# Patient Record
Sex: MALE | Race: WHITE | Employment: UNEMPLOYED | URBAN - METROPOLITAN AREA
[De-identification: names, ages, dates, MRNs, and addresses within clinical notes are randomized per-mention and may not be internally consistent; named-entity substitution may affect disease eponyms.]

---

## 2018-12-24 ENCOUNTER — HOSPITAL ENCOUNTER (EMERGENCY)
Facility: CLINIC | Age: 53
Discharge: HOME | End: 2018-12-24
Admitting: PHYSICIAN ASSISTANT
Payer: COMMERCIAL

## 2018-12-24 VITALS
RESPIRATION RATE: 22 BRPM | HEART RATE: 65 BPM | DIASTOLIC BLOOD PRESSURE: 135 MMHG | OXYGEN SATURATION: 100 % | WEIGHT: 160 LBS | BODY MASS INDEX: 24.25 KG/M2 | HEIGHT: 68 IN | TEMPERATURE: 98.2 F | SYSTOLIC BLOOD PRESSURE: 183 MMHG

## 2018-12-24 DIAGNOSIS — S61.412A LACERATION OF LEFT HAND WITHOUT FOREIGN BODY, INITIAL ENCOUNTER: ICD-10-CM

## 2018-12-24 DIAGNOSIS — S69.92XA INJURY OF LEFT HAND, INITIAL ENCOUNTER: ICD-10-CM

## 2018-12-24 PROCEDURE — 99283 EMERGENCY DEPT VISIT LOW MDM: CPT

## 2018-12-24 PROCEDURE — 12001 RPR S/N/AX/GEN/TRNK 2.5CM/<: CPT

## 2018-12-24 ASSESSMENT — ENCOUNTER SYMPTOMS
WOUND: 1
NUMBNESS: 0

## 2018-12-24 ASSESSMENT — MIFFLIN-ST. JEOR: SCORE: 1545.26

## 2018-12-24 NOTE — ED AVS SNAPSHOT
[facility] Emergency Department  [address]  [facility] [address]  Phone:  [phone]  Fax:  [phone]                                    Pranav Loo   MRN: [id number]    Department:  [facility] Emergency Department   Date of Visit:  12/24/2018           After Visit Summary Signature Page    I have received my discharge instructions, and my questions have been answered. I have discussed any challenges I see with this plan with the nurse or doctor.    ..........................................................................................................................................  Patient/Patient Representative Signature      ..........................................................................................................................................  Patient Representative Print Name and Relationship to Patient    ..................................................               ................................................  Date                                   Time    ..........................................................................................................................................  Reviewed by Signature/Title    ...................................................              ..............................................  Date                                               Time          22EPIC Rev 08/18

## 2018-12-25 NOTE — DISCHARGE INSTRUCTIONS

## 2018-12-25 NOTE — ED PROVIDER NOTES
"  History     Chief Complaint:  Laceration    HPI   Pranav Loo is a 53 year old male who presents to the emergency department today for evaluation of laceration to the left hand. The patient reports he was opening gifts and cut his left hand with a . The patient denies numbness or tingling. The patient's last tetanus status was last updated on 03/25/2011.  Denies any other injury.  He is on any blood thinners.  The patient is right-handed.    Allergies:  Penicillin G     Medications:    Apresoline  Hydrodiuril     Past Medical History:    DJD  Hypertension  Aspirin     Past Surgical History:    Arthroplasty   Pelvis/hip surgery   Cardiac stent    Family History:    Brother: CAD    Social History:  The patient was accompanied to the ED by his daughter.  Smoking Status: Never Smoker  Smokeless Tobacco: Never Used  Alcohol Use: Negative   Marital Status:        Review of Systems   Skin: Positive for wound.   Neurological: Negative for numbness.   All other systems reviewed and are negative.    Physical Exam     Patient Vitals for the past 24 hrs:   BP Temp Temp src Pulse Resp SpO2 Height Weight   12/24/18 1959 (!) 183/135 98.2  F (36.8  C) Temporal 65 22 100 % 1.727 m (5' 8\") 72.6 kg (160 lb)     Physical Exam  General: Well appearing, well nourished. Normal mood and affect.  Skin: Simple, linear laceration dorsal webspace of the left thumb and index finger.  Proximally 1.5 cm in length.  Superficial, no obvious tendon involvement.  Able to visualize extensor tendons of left thumb.  No apparent laceration.  Range of motion intact. Bleeding controlled.  HEENT: Head: Normocephalic, atraumatic, no visible masses.   Eyes: Conjunctiva clear.  Cardiac: Normal rate and regular rhythm, no murmur or gallop.   Lungs: Clear to auscultation.  Musculoskeletal: Left Hand: The finger flexors (FDS/FDP) and extensors are intact.The thumb exam is normal, including: Adduction, abduction, flexion, extension, " opposition. There are no sensory deficits, Median, Ulnar, and Radial nerve function is normal. Radial artery pulsations are normal. Capillary refill is normal. Normal gait and station. No calf tenderness or swelling.   Neurologic: Oriented x 3. GCS: 15.  Psychiatric: Intact recent and remote memory, judgment and insight, normal mood and affect.       Emergency Department Course     Procedures:    Narrative: Procedure: Laceration Repair        LACERATION:  A 1.5 cm laceration.      LOCATION: Webspace between the left thumb and index finger, dorsal aspect.      FUNCTION:  Distally sensation, circulation, motor and tendon function are intact.      ANESTHESIA:  Digital block using 1% Lidocaine with epinephrine  total of 3 mLs      PREPARATION:  Irrigation and Scrubbing with Normal Saline      DEBRIDEMENT:  wound explored, no foreign body found      CLOSURE:  Wound was closed with One Layer.  Skin closed with 4 x 5.0 Ethylon using sutures.     Interventions:  None     Emergency Department Course:    2002 Nursing notes and vitals reviewed.    2005 I performed an exam of the patient as documented above.     2040 I personally answered all related questions prior to discharge.    Impression & Plan      Medical Decision Making:  Pranav Loo is a 53 year old male who presented to the ED today for evaluation of a laceration.  Details of the patient's history can be noted in the HPI.  The wound was carefully explored and evaluated.  The laceration was closed as noted in the procedure note above.  There was no evidence of muscular, tendon, bone, or nerve damage with this laceration.  There is no evidence of foreign body.  Possible complications such as infection and scarring were reviewed with the patient.  They will return to the ED for any signs of increased redness, streaking, drainage, fevers, new concerns.  They will apply bacitracin daily.  Additional suture care was discussed they will follow-up with her primary care  provider or another medical facility and provided in the discharge paperwork.  For suture removal as noted in their discharge instructions.  All questions were answered prior to the patient's discharge.  They were in agreement with the treatment plan as stated above.     Diagnosis:    ICD-10-CM    1. Laceration of left hand without foreign body, initial encounter S61.412A      Disposition:   The patient is discharged to home.     Discharge Medications:  No discharge medications    Scribe Disclosure:  I, Marina Landa, am serving as a scribe at 8:02 PM on 12/24/2018 to document services personally performed by Deb Mooney PA based on my observations and the provider's statements to me.    This was created at least in part with a voice recognition software. Mistakes/typos may be present.       [facility] EMERGENCY DEPARTMENT       Deb Mooney PA  12/24/18 [id number]

## 2018-12-25 NOTE — ED TRIAGE NOTES
Laceration to left hand.  Bleeding controlled PTA.      ABCs intact.  Alert and oriented x 3.    Tetanus status unknown.

## 2019-02-19 ENCOUNTER — HOSPITAL ENCOUNTER (OUTPATIENT)
Facility: CLINIC | Age: 54
Setting detail: OBSERVATION
Discharge: HOME | End: 2019-02-22
Attending: EMERGENCY MEDICINE | Admitting: INTERNAL MEDICINE
Payer: COMMERCIAL

## 2019-02-19 ENCOUNTER — APPOINTMENT (OUTPATIENT)
Dept: GENERAL RADIOLOGY | Facility: CLINIC | Age: 54
End: 2019-02-19
Attending: EMERGENCY MEDICINE
Payer: COMMERCIAL

## 2019-02-19 DIAGNOSIS — T68.XXXA HYPOTHERMIA, INITIAL ENCOUNTER: ICD-10-CM

## 2019-02-19 DIAGNOSIS — I10 BENIGN ESSENTIAL HYPERTENSION: Primary | ICD-10-CM

## 2019-02-19 DIAGNOSIS — R07.9 CHEST PAIN: ICD-10-CM

## 2019-02-19 LAB
ALBUMIN SERPL-MCNC: 4.5 G/DL (ref 3.4–5)
ALP SERPL-CCNC: 100 U/L (ref 40–150)
ALT SERPL W P-5'-P-CCNC: 39 U/L (ref 0–70)
ANION GAP SERPL CALCULATED.3IONS-SCNC: 17 MMOL/L (ref 3–14)
AST SERPL W P-5'-P-CCNC: 21 U/L (ref 0–45)
BASOPHILS # BLD AUTO: 0 10E9/L (ref 0–0.2)
BASOPHILS NFR BLD AUTO: 0.1 %
BILIRUB SERPL-MCNC: 0.6 MG/DL (ref 0.2–1.3)
BUN SERPL-MCNC: 28 MG/DL (ref 7–30)
CALCIUM SERPL-MCNC: 10 MG/DL (ref 8.5–10.1)
CHLORIDE SERPL-SCNC: 104 MMOL/L (ref 94–109)
CO2 SERPL-SCNC: 17 MMOL/L (ref 20–32)
CREAT SERPL-MCNC: 1.47 MG/DL (ref 0.66–1.25)
DIFFERENTIAL METHOD BLD: ABNORMAL
EOSINOPHIL # BLD AUTO: 0 10E9/L (ref 0–0.7)
EOSINOPHIL NFR BLD AUTO: 0 %
ERYTHROCYTE [DISTWIDTH] IN BLOOD BY AUTOMATED COUNT: 12.9 % (ref 10–15)
ETHANOL SERPL-MCNC: 0.04 G/DL
GFR SERPL CREATININE-BSD FRML MDRD: 54 ML/MIN/{1.73_M2}
GLUCOSE BLDC GLUCOMTR-MCNC: 161 MG/DL (ref 70–99)
GLUCOSE SERPL-MCNC: 111 MG/DL (ref 70–99)
HCT VFR BLD AUTO: 46.3 % (ref 40–53)
HGB BLD-MCNC: 16.5 G/DL (ref 13.3–17.7)
IMM GRANULOCYTES # BLD: 0.1 10E9/L (ref 0–0.4)
IMM GRANULOCYTES NFR BLD: 0.4 %
LYMPHOCYTES # BLD AUTO: 3.4 10E9/L (ref 0.8–5.3)
LYMPHOCYTES NFR BLD AUTO: 16 %
MAGNESIUM SERPL-MCNC: 2.4 MG/DL (ref 1.6–2.3)
MCH RBC QN AUTO: 32 PG (ref 26.5–33)
MCHC RBC AUTO-ENTMCNC: 35.6 G/DL (ref 31.5–36.5)
MCV RBC AUTO: 90 FL (ref 78–100)
MONOCYTES # BLD AUTO: 1.1 10E9/L (ref 0–1.3)
MONOCYTES NFR BLD AUTO: 5 %
NEUTROPHILS # BLD AUTO: 16.5 10E9/L (ref 1.6–8.3)
NEUTROPHILS NFR BLD AUTO: 78.5 %
NRBC # BLD AUTO: 0 10*3/UL
NRBC BLD AUTO-RTO: 0 /100
PLATELET # BLD AUTO: 297 10E9/L (ref 150–450)
POTASSIUM SERPL-SCNC: 2.9 MMOL/L (ref 3.4–5.3)
POTASSIUM SERPL-SCNC: 4.2 MMOL/L (ref 3.4–5.3)
PROT SERPL-MCNC: 8.3 G/DL (ref 6.8–8.8)
RBC # BLD AUTO: 5.16 10E12/L (ref 4.4–5.9)
SODIUM SERPL-SCNC: 138 MMOL/L (ref 133–144)
TROPONIN I SERPL-MCNC: <0.015 UG/L (ref 0–0.04)
WBC # BLD AUTO: 21 10E9/L (ref 4–11)

## 2019-02-19 PROCEDURE — 96374 THER/PROPH/DIAG INJ IV PUSH: CPT

## 2019-02-19 PROCEDURE — 99292 CRITICAL CARE ADDL 30 MIN: CPT

## 2019-02-19 PROCEDURE — 25800025 ZZH RX 258

## 2019-02-19 PROCEDURE — 84484 ASSAY OF TROPONIN QUANT: CPT | Performed by: EMERGENCY MEDICINE

## 2019-02-19 PROCEDURE — 85025 COMPLETE CBC W/AUTO DIFF WBC: CPT | Performed by: EMERGENCY MEDICINE

## 2019-02-19 PROCEDURE — 36415 COLL VENOUS BLD VENIPUNCTURE: CPT | Performed by: INTERNAL MEDICINE

## 2019-02-19 PROCEDURE — 25000132 ZZH RX MED GY IP 250 OP 250 PS 637: Performed by: EMERGENCY MEDICINE

## 2019-02-19 PROCEDURE — G0378 HOSPITAL OBSERVATION PER HR: HCPCS

## 2019-02-19 PROCEDURE — 00000146 ZZHCL STATISTIC GLUCOSE BY METER IP

## 2019-02-19 PROCEDURE — 80053 COMPREHEN METABOLIC PANEL: CPT | Performed by: EMERGENCY MEDICINE

## 2019-02-19 PROCEDURE — 99220 ZZC INITIAL OBSERVATION CARE,LEVL III: CPT | Performed by: INTERNAL MEDICINE

## 2019-02-19 PROCEDURE — 93005 ELECTROCARDIOGRAM TRACING: CPT

## 2019-02-19 PROCEDURE — 83036 HEMOGLOBIN GLYCOSYLATED A1C: CPT | Performed by: INTERNAL MEDICINE

## 2019-02-19 PROCEDURE — 25800025 ZZH RX 258: Performed by: EMERGENCY MEDICINE

## 2019-02-19 PROCEDURE — 25800030 ZZH RX IP 258 OP 636: Performed by: INTERNAL MEDICINE

## 2019-02-19 PROCEDURE — 99291 CRITICAL CARE FIRST HOUR: CPT

## 2019-02-19 PROCEDURE — 83735 ASSAY OF MAGNESIUM: CPT | Performed by: EMERGENCY MEDICINE

## 2019-02-19 PROCEDURE — 84132 ASSAY OF SERUM POTASSIUM: CPT | Performed by: INTERNAL MEDICINE

## 2019-02-19 PROCEDURE — 25000132 ZZH RX MED GY IP 250 OP 250 PS 637: Performed by: INTERNAL MEDICINE

## 2019-02-19 PROCEDURE — 25000128 H RX IP 250 OP 636: Performed by: EMERGENCY MEDICINE

## 2019-02-19 PROCEDURE — 25000128 H RX IP 250 OP 636

## 2019-02-19 PROCEDURE — 71045 X-RAY EXAM CHEST 1 VIEW: CPT

## 2019-02-19 PROCEDURE — 80320 DRUG SCREEN QUANTALCOHOLS: CPT | Performed by: EMERGENCY MEDICINE

## 2019-02-19 PROCEDURE — 84484 ASSAY OF TROPONIN QUANT: CPT | Mod: 91 | Performed by: INTERNAL MEDICINE

## 2019-02-19 RX ORDER — TRIAMTERENE AND HYDROCHLOROTHIAZIDE 37.5; 25 MG/1; MG/1
1 CAPSULE ORAL EVERY MORNING
Status: ON HOLD | COMMUNITY
End: 2019-02-22

## 2019-02-19 RX ORDER — LOSARTAN POTASSIUM 100 MG/1
100 TABLET ORAL DAILY
COMMUNITY

## 2019-02-19 RX ORDER — CLINDAMYCIN HCL 300 MG
600 CAPSULE ORAL DAILY PRN
COMMUNITY

## 2019-02-19 RX ORDER — CLOPIDOGREL BISULFATE 75 MG/1
75 TABLET ORAL DAILY
Status: DISCONTINUED | OUTPATIENT
Start: 2019-02-20 | End: 2019-02-22 | Stop reason: HOSPADM

## 2019-02-19 RX ORDER — ATORVASTATIN CALCIUM 40 MG/1
40 TABLET, FILM COATED ORAL AT BEDTIME
Status: DISCONTINUED | OUTPATIENT
Start: 2019-02-19 | End: 2019-02-22 | Stop reason: HOSPADM

## 2019-02-19 RX ORDER — ASPIRIN 81 MG/1
81 TABLET, CHEWABLE ORAL DAILY
COMMUNITY

## 2019-02-19 RX ORDER — POTASSIUM CHLORIDE 1.5 G/1.58G
40 POWDER, FOR SOLUTION ORAL ONCE
Status: COMPLETED | OUTPATIENT
Start: 2019-02-19 | End: 2019-02-19

## 2019-02-19 RX ORDER — NITROGLYCERIN 0.4 MG/1
0.4 TABLET SUBLINGUAL EVERY 5 MIN PRN
Status: DISCONTINUED | OUTPATIENT
Start: 2019-02-19 | End: 2019-02-19

## 2019-02-19 RX ORDER — POTASSIUM CHLORIDE 7.45 MG/ML
10 INJECTION INTRAVENOUS
Status: DISCONTINUED | OUTPATIENT
Start: 2019-02-19 | End: 2019-02-22 | Stop reason: HOSPADM

## 2019-02-19 RX ORDER — LIDOCAINE 40 MG/G
CREAM TOPICAL
Status: DISCONTINUED | OUTPATIENT
Start: 2019-02-19 | End: 2019-02-21

## 2019-02-19 RX ORDER — POTASSIUM CL/LIDO/0.9 % NACL 10MEQ/0.1L
10 INTRAVENOUS SOLUTION, PIGGYBACK (ML) INTRAVENOUS
Status: DISCONTINUED | OUTPATIENT
Start: 2019-02-19 | End: 2019-02-22 | Stop reason: HOSPADM

## 2019-02-19 RX ORDER — ASPIRIN 81 MG/1
324 TABLET, CHEWABLE ORAL ONCE
Status: COMPLETED | OUTPATIENT
Start: 2019-02-19 | End: 2019-02-19

## 2019-02-19 RX ORDER — LABETALOL HYDROCHLORIDE 5 MG/ML
10 INJECTION, SOLUTION INTRAVENOUS
Status: DISCONTINUED | OUTPATIENT
Start: 2019-02-19 | End: 2019-02-22 | Stop reason: HOSPADM

## 2019-02-19 RX ORDER — NITROGLYCERIN 0.4 MG/1
0.4 TABLET SUBLINGUAL EVERY 5 MIN PRN
Status: DISCONTINUED | OUTPATIENT
Start: 2019-02-19 | End: 2019-02-22 | Stop reason: HOSPADM

## 2019-02-19 RX ORDER — ONDANSETRON 2 MG/ML
4 INJECTION INTRAMUSCULAR; INTRAVENOUS EVERY 6 HOURS PRN
Status: DISCONTINUED | OUTPATIENT
Start: 2019-02-19 | End: 2019-02-22 | Stop reason: HOSPADM

## 2019-02-19 RX ORDER — CLOPIDOGREL BISULFATE 75 MG/1
75 TABLET ORAL DAILY
COMMUNITY

## 2019-02-19 RX ORDER — POTASSIUM CHLORIDE 1500 MG/1
20-40 TABLET, EXTENDED RELEASE ORAL
Status: DISCONTINUED | OUTPATIENT
Start: 2019-02-19 | End: 2019-02-22 | Stop reason: HOSPADM

## 2019-02-19 RX ORDER — SODIUM CHLORIDE 9 MG/ML
INJECTION, SOLUTION INTRAVENOUS CONTINUOUS
Status: DISCONTINUED | OUTPATIENT
Start: 2019-02-19 | End: 2019-02-20

## 2019-02-19 RX ORDER — ALUMINA, MAGNESIA, AND SIMETHICONE 2400; 2400; 240 MG/30ML; MG/30ML; MG/30ML
30 SUSPENSION ORAL EVERY 4 HOURS PRN
Status: DISCONTINUED | OUTPATIENT
Start: 2019-02-19 | End: 2019-02-22 | Stop reason: HOSPADM

## 2019-02-19 RX ORDER — FAMOTIDINE 20 MG
1000 TABLET ORAL DAILY
COMMUNITY

## 2019-02-19 RX ORDER — POTASSIUM CHLORIDE 1.5 G/1.58G
20-40 POWDER, FOR SOLUTION ORAL
Status: DISCONTINUED | OUTPATIENT
Start: 2019-02-19 | End: 2019-02-22 | Stop reason: HOSPADM

## 2019-02-19 RX ORDER — ONDANSETRON 4 MG/1
4 TABLET, ORALLY DISINTEGRATING ORAL EVERY 6 HOURS PRN
Status: DISCONTINUED | OUTPATIENT
Start: 2019-02-19 | End: 2019-02-22 | Stop reason: HOSPADM

## 2019-02-19 RX ORDER — ACETAMINOPHEN 650 MG/1
650 SUPPOSITORY RECTAL EVERY 4 HOURS PRN
Status: DISCONTINUED | OUTPATIENT
Start: 2019-02-19 | End: 2019-02-22 | Stop reason: HOSPADM

## 2019-02-19 RX ORDER — NITROGLYCERIN 0.4 MG/1
0.4 TABLET SUBLINGUAL EVERY 5 MIN PRN
COMMUNITY

## 2019-02-19 RX ORDER — ATORVASTATIN CALCIUM 40 MG/1
40 TABLET, FILM COATED ORAL AT BEDTIME
COMMUNITY

## 2019-02-19 RX ORDER — LOSARTAN POTASSIUM 100 MG/1
100 TABLET ORAL DAILY
Status: DISCONTINUED | OUTPATIENT
Start: 2019-02-20 | End: 2019-02-22 | Stop reason: HOSPADM

## 2019-02-19 RX ORDER — ASPIRIN 81 MG/1
81 TABLET, CHEWABLE ORAL DAILY
Status: DISCONTINUED | OUTPATIENT
Start: 2019-02-20 | End: 2019-02-21

## 2019-02-19 RX ORDER — NALOXONE HYDROCHLORIDE 0.4 MG/ML
.1-.4 INJECTION, SOLUTION INTRAMUSCULAR; INTRAVENOUS; SUBCUTANEOUS
Status: DISCONTINUED | OUTPATIENT
Start: 2019-02-19 | End: 2019-02-22 | Stop reason: HOSPADM

## 2019-02-19 RX ORDER — POTASSIUM CHLORIDE 29.8 MG/ML
20 INJECTION INTRAVENOUS
Status: DISCONTINUED | OUTPATIENT
Start: 2019-02-19 | End: 2019-02-19

## 2019-02-19 RX ORDER — METOPROLOL TARTRATE 25 MG/1
25 TABLET, FILM COATED ORAL 2 TIMES DAILY
Status: DISCONTINUED | OUTPATIENT
Start: 2019-02-19 | End: 2019-02-20

## 2019-02-19 RX ORDER — ONDANSETRON 2 MG/ML
INJECTION INTRAMUSCULAR; INTRAVENOUS
Status: COMPLETED
Start: 2019-02-19 | End: 2019-02-19

## 2019-02-19 RX ORDER — LORAZEPAM 0.5 MG/1
0.5 TABLET ORAL 3 TIMES DAILY PRN
Status: DISCONTINUED | OUTPATIENT
Start: 2019-02-19 | End: 2019-02-22 | Stop reason: HOSPADM

## 2019-02-19 RX ORDER — ACETAMINOPHEN 325 MG/1
650 TABLET ORAL EVERY 4 HOURS PRN
Status: DISCONTINUED | OUTPATIENT
Start: 2019-02-19 | End: 2019-02-22 | Stop reason: HOSPADM

## 2019-02-19 RX ORDER — MORPHINE SULFATE 2 MG/ML
1 INJECTION, SOLUTION INTRAMUSCULAR; INTRAVENOUS
Status: DISCONTINUED | OUTPATIENT
Start: 2019-02-19 | End: 2019-02-22 | Stop reason: HOSPADM

## 2019-02-19 RX ORDER — DEXTROSE MONOHYDRATE 25 G/50ML
50 INJECTION, SOLUTION INTRAVENOUS ONCE
Status: COMPLETED | OUTPATIENT
Start: 2019-02-19 | End: 2019-02-19

## 2019-02-19 RX ORDER — POTASSIUM CHLORIDE 1.5 G/1.58G
20 POWDER, FOR SOLUTION ORAL DAILY
COMMUNITY

## 2019-02-19 RX ORDER — METOPROLOL TARTRATE 25 MG/1
25 TABLET, FILM COATED ORAL 2 TIMES DAILY
Status: ON HOLD | COMMUNITY
End: 2019-02-22

## 2019-02-19 RX ADMIN — DEXTROSE MONOHYDRATE 50 ML: 25 INJECTION, SOLUTION INTRAVENOUS at 19:11

## 2019-02-19 RX ADMIN — ATORVASTATIN CALCIUM 40 MG: 40 TABLET, FILM COATED ORAL at 21:59

## 2019-02-19 RX ADMIN — ONDANSETRON 4 MG: 2 INJECTION INTRAMUSCULAR; INTRAVENOUS at 19:10

## 2019-02-19 RX ADMIN — METOPROLOL TARTRATE 25 MG: 25 TABLET ORAL at 21:59

## 2019-02-19 RX ADMIN — POTASSIUM CHLORIDE 40 MEQ: 1.5 POWDER, FOR SOLUTION ORAL at 20:21

## 2019-02-19 RX ADMIN — ASPIRIN 81 MG 324 MG: 81 TABLET ORAL at 19:17

## 2019-02-19 RX ADMIN — SODIUM CHLORIDE: 9 INJECTION, SOLUTION INTRAVENOUS at 21:59

## 2019-02-19 RX ADMIN — SODIUM CHLORIDE 2000 ML: 9 INJECTION, SOLUTION INTRAVENOUS at 19:16

## 2019-02-19 ASSESSMENT — ENCOUNTER SYMPTOMS: SHORTNESS OF BREATH: 1

## 2019-02-19 ASSESSMENT — MIFFLIN-ST. JEOR: SCORE: 1552.5

## 2019-02-20 ENCOUNTER — APPOINTMENT (OUTPATIENT)
Dept: NUCLEAR MEDICINE | Facility: CLINIC | Age: 54
End: 2019-02-20
Attending: INTERNAL MEDICINE
Payer: COMMERCIAL

## 2019-02-20 LAB
ANION GAP SERPL CALCULATED.3IONS-SCNC: 9 MMOL/L (ref 3–14)
BUN SERPL-MCNC: 22 MG/DL (ref 7–30)
CALCIUM SERPL-MCNC: 8.3 MG/DL (ref 8.5–10.1)
CHLORIDE SERPL-SCNC: 113 MMOL/L (ref 94–109)
CHOLEST SERPL-MCNC: 129 MG/DL
CO2 SERPL-SCNC: 19 MMOL/L (ref 20–32)
CREAT SERPL-MCNC: 1.18 MG/DL (ref 0.66–1.25)
ERYTHROCYTE [DISTWIDTH] IN BLOOD BY AUTOMATED COUNT: 13.2 % (ref 10–15)
GFR SERPL CREATININE-BSD FRML MDRD: 70 ML/MIN/{1.73_M2}
GLUCOSE BLDC GLUCOMTR-MCNC: 101 MG/DL (ref 70–99)
GLUCOSE BLDC GLUCOMTR-MCNC: 86 MG/DL (ref 70–99)
GLUCOSE SERPL-MCNC: 68 MG/DL (ref 70–99)
HBA1C MFR BLD: 6.1 % (ref 0–5.6)
HCT VFR BLD AUTO: 42.2 % (ref 40–53)
HDLC SERPL-MCNC: 46 MG/DL
HGB BLD-MCNC: 15 G/DL (ref 13.3–17.7)
LDLC SERPL CALC-MCNC: 62 MG/DL
MCH RBC QN AUTO: 31.9 PG (ref 26.5–33)
MCHC RBC AUTO-ENTMCNC: 35.5 G/DL (ref 31.5–36.5)
MCV RBC AUTO: 90 FL (ref 78–100)
NONHDLC SERPL-MCNC: 83 MG/DL
PLATELET # BLD AUTO: 218 10E9/L (ref 150–450)
POTASSIUM SERPL-SCNC: 3.8 MMOL/L (ref 3.4–5.3)
RBC # BLD AUTO: 4.7 10E12/L (ref 4.4–5.9)
SODIUM SERPL-SCNC: 141 MMOL/L (ref 133–144)
TRIGL SERPL-MCNC: 103 MG/DL
TROPONIN I SERPL-MCNC: 0.02 UG/L (ref 0–0.04)
TROPONIN I SERPL-MCNC: 0.03 UG/L (ref 0–0.04)
TROPONIN I SERPL-MCNC: 0.04 UG/L (ref 0–0.04)
WBC # BLD AUTO: 10.3 10E9/L (ref 4–11)

## 2019-02-20 PROCEDURE — 99214 OFFICE O/P EST MOD 30 MIN: CPT | Mod: 25 | Performed by: INTERNAL MEDICINE

## 2019-02-20 PROCEDURE — 00000146 ZZHCL STATISTIC GLUCOSE BY METER IP

## 2019-02-20 PROCEDURE — 93016 CV STRESS TEST SUPVJ ONLY: CPT | Performed by: INTERNAL MEDICINE

## 2019-02-20 PROCEDURE — 84484 ASSAY OF TROPONIN QUANT: CPT | Performed by: INTERNAL MEDICINE

## 2019-02-20 PROCEDURE — 25800030 ZZH RX IP 258 OP 636: Performed by: INTERNAL MEDICINE

## 2019-02-20 PROCEDURE — 25000132 ZZH RX MED GY IP 250 OP 250 PS 637: Performed by: INTERNAL MEDICINE

## 2019-02-20 PROCEDURE — 93017 CV STRESS TEST TRACING ONLY: CPT

## 2019-02-20 PROCEDURE — 78452 HT MUSCLE IMAGE SPECT MULT: CPT

## 2019-02-20 PROCEDURE — 78452 HT MUSCLE IMAGE SPECT MULT: CPT | Mod: 26 | Performed by: INTERNAL MEDICINE

## 2019-02-20 PROCEDURE — 40000863 ZZH STATISTIC RADIOLOGY XRAY, US, CT, MAR, NM

## 2019-02-20 PROCEDURE — 34300033 ZZH RX 343: Performed by: INTERNAL MEDICINE

## 2019-02-20 PROCEDURE — 99207 ZZC CDG-CODE CATEGORY CHANGED: CPT | Performed by: INTERNAL MEDICINE

## 2019-02-20 PROCEDURE — 80061 LIPID PANEL: CPT | Performed by: INTERNAL MEDICINE

## 2019-02-20 PROCEDURE — 36415 COLL VENOUS BLD VENIPUNCTURE: CPT | Performed by: INTERNAL MEDICINE

## 2019-02-20 PROCEDURE — G0378 HOSPITAL OBSERVATION PER HR: HCPCS

## 2019-02-20 PROCEDURE — 80048 BASIC METABOLIC PNL TOTAL CA: CPT | Performed by: INTERNAL MEDICINE

## 2019-02-20 PROCEDURE — 85027 COMPLETE CBC AUTOMATED: CPT | Performed by: INTERNAL MEDICINE

## 2019-02-20 PROCEDURE — A9502 TC99M TETROFOSMIN: HCPCS | Performed by: INTERNAL MEDICINE

## 2019-02-20 PROCEDURE — 25000128 H RX IP 250 OP 636: Performed by: INTERNAL MEDICINE

## 2019-02-20 PROCEDURE — 93018 CV STRESS TEST I&R ONLY: CPT | Performed by: INTERNAL MEDICINE

## 2019-02-20 PROCEDURE — 99226 ZZC SUBSEQUENT OBSERVATION CARE,LEVEL III: CPT | Performed by: INTERNAL MEDICINE

## 2019-02-20 RX ORDER — ALBUTEROL SULFATE 90 UG/1
2 AEROSOL, METERED RESPIRATORY (INHALATION) EVERY 5 MIN PRN
Status: DISCONTINUED | OUTPATIENT
Start: 2019-02-20 | End: 2019-02-20

## 2019-02-20 RX ORDER — REGADENOSON 0.08 MG/ML
0.4 INJECTION, SOLUTION INTRAVENOUS ONCE
Status: COMPLETED | OUTPATIENT
Start: 2019-02-20 | End: 2019-02-20

## 2019-02-20 RX ORDER — POTASSIUM CHLORIDE 1.5 G/1.58G
20 POWDER, FOR SOLUTION ORAL DAILY
Status: DISCONTINUED | OUTPATIENT
Start: 2019-02-20 | End: 2019-02-20

## 2019-02-20 RX ORDER — TRIAMTERENE/HYDROCHLOROTHIAZID 37.5-25 MG
1 TABLET ORAL EVERY MORNING
Status: DISCONTINUED | OUTPATIENT
Start: 2019-02-20 | End: 2019-02-20

## 2019-02-20 RX ORDER — ACYCLOVIR 200 MG/1
0-1 CAPSULE ORAL
Status: DISCONTINUED | OUTPATIENT
Start: 2019-02-20 | End: 2019-02-20

## 2019-02-20 RX ORDER — HYDRALAZINE HYDROCHLORIDE 25 MG/1
25 TABLET, FILM COATED ORAL EVERY 8 HOURS SCHEDULED
Status: DISCONTINUED | OUTPATIENT
Start: 2019-02-20 | End: 2019-02-22

## 2019-02-20 RX ORDER — CAFFEINE 200 MG
200 TABLET ORAL
Status: DISCONTINUED | OUTPATIENT
Start: 2019-02-20 | End: 2019-02-20

## 2019-02-20 RX ORDER — CAFFEINE CITRATE 20 MG/ML
60 SOLUTION INTRAVENOUS
Status: DISCONTINUED | OUTPATIENT
Start: 2019-02-20 | End: 2019-02-20

## 2019-02-20 RX ORDER — AMINOPHYLLINE 25 MG/ML
50-100 INJECTION, SOLUTION INTRAVENOUS
Status: DISCONTINUED | OUTPATIENT
Start: 2019-02-20 | End: 2019-02-20

## 2019-02-20 RX ORDER — CARVEDILOL 12.5 MG/1
12.5 TABLET ORAL 2 TIMES DAILY WITH MEALS
Status: DISCONTINUED | OUTPATIENT
Start: 2019-02-20 | End: 2019-02-22 | Stop reason: HOSPADM

## 2019-02-20 RX ADMIN — HYDRALAZINE HYDROCHLORIDE 25 MG: 25 TABLET ORAL at 21:54

## 2019-02-20 RX ADMIN — ASPIRIN 81 MG 81 MG: 81 TABLET ORAL at 07:59

## 2019-02-20 RX ADMIN — CARVEDILOL 12.5 MG: 12.5 TABLET, FILM COATED ORAL at 09:33

## 2019-02-20 RX ADMIN — TETROFOSMIN 10 MCI.: 1.38 INJECTION, POWDER, LYOPHILIZED, FOR SOLUTION INTRAVENOUS at 07:20

## 2019-02-20 RX ADMIN — CLOPIDOGREL BISULFATE 75 MG: 75 TABLET ORAL at 08:00

## 2019-02-20 RX ADMIN — CARVEDILOL 12.5 MG: 12.5 TABLET, FILM COATED ORAL at 18:15

## 2019-02-20 RX ADMIN — HYDRALAZINE HYDROCHLORIDE 25 MG: 25 TABLET ORAL at 16:23

## 2019-02-20 RX ADMIN — REGADENOSON 0.4 MG: 0.08 INJECTION, SOLUTION INTRAVENOUS at 09:08

## 2019-02-20 RX ADMIN — TETROFOSMIN 32.5 MCI.: 1.38 INJECTION, POWDER, LYOPHILIZED, FOR SOLUTION INTRAVENOUS at 09:12

## 2019-02-20 RX ADMIN — LOSARTAN POTASSIUM 100 MG: 100 TABLET, FILM COATED ORAL at 08:00

## 2019-02-20 RX ADMIN — ATORVASTATIN CALCIUM 40 MG: 40 TABLET, FILM COATED ORAL at 21:54

## 2019-02-20 RX ADMIN — ACETAMINOPHEN 650 MG: 325 TABLET, FILM COATED ORAL at 19:36

## 2019-02-20 RX ADMIN — SODIUM CHLORIDE: 9 INJECTION, SOLUTION INTRAVENOUS at 07:05

## 2019-02-20 NOTE — PROGRESS NOTES
[facility]    Medicine Progress Note - Hospitalist Service       Date of Admission:  2/19/2019  Assessment & Plan   Mr. Pranav Loo is a delightful 53-year-old  gentleman with a past medical history notable for coronary artery disease, hypertension, dyslipidemia, degenerative disk disease of the lumbar spine, osteoarthritis of the hip, and chronic kidney disease stage III, who has presented to the Emergency Department with the complaint of chest pain after shoveling snow.     Chest Pain - It is described as intermittent precordial chest pressure with exertion, 5-6 / 10 in severity, associated with diaphoresis and feeling cold, but no radiation to the back, jaw, or left arm.  HIs symptoms of excessive sweating and feeling cold are likely due to hypothermia, in the setting working outside shoveling snow. Known CAD, s/p YAMIL to the LAD in 01/2016. The electrocardiogram reveals a normal sinus rhythm, LVH, nonspecific ST abnormality, and RV conduction delay. Troponins negative, d-dimer negative. CXR clear.    - Mildly abnormal NM stress test: shows partially fixed basal to mid anteroseptal defect c/w nontransmural scar with mild chhaya-infarct ischemia.   - Appreciate cardiology consult. Discussed with Dr. Peterson. Plan for coronary angiogram tomorrow.     HTN   - BP uncontrolled in the 170's to 180s on 02/20/19 > stop IVF  - continues on PTA losartan 100 mg daily   - changed metoprolol 25 mg BID to coreg 12.5 mg BID on 02/20/19.   - holding triamterene/HCTZ in the context of mild SEBASTIAN, hold on resuming today due to plan for contrast tomorrow  - start hydralazine 25 mg TID prior to cath with plan to resume diuretic post cath.     Acute kidney injury:  The patient has a known history of chronic kidney disease stage III with a baseline creatinine in the range of 1.1-1.2. Cr at admission 1.47 / BUN of 28, secondary to prerenal azotemia due to volume depletion after excessive exertion.   - Cr back to  baseline with IVF.     Hypothermia:  Upon arrival, the patient was hypothermic with a temperature of 92.9 degrees Fahrenheit due to shoveling snow outside for several hours.  His temperature has improved to 97.4 after treatment with a Faby Hugger and warm normal saline.   - Temperature normal on 02/20/19.    Hypokalemia:  The potassium level is 2.9.  Of note, he is on Dyazide 37.5-25 mg p.o. daily, which will be held.  In the ER, the patient has received 40 mEq of potassium chloride.  I will place the patient on a potassium replacement protocol.  His potassium level will be monitored.     Pre-diabetes: A1c 6.1  , mildly low today, asymptomatic. Not on insulin or diabetic medication.   - Follow BS TID  Recent Labs   Lab 02/20/19  0606 02/19/19  1905 02/19/19  1900   GLC 68*  --  111*   BGM  --  161*  --        Dyslipidemia:    - continue PTA statin. LDL 62, HDL 46.     Leukocytosis, RESOLVED 21,000 at admission, secondary to stress response. Normal on 02/20/19          Diet: Regular Diet Adult    DVT Prophylaxis: Pneumatic Compression Devices  Lainez Catheter: not present  Code Status: Full Code      Disposition Plan   Expected discharge: 1-2 days, recommended to prior living arrangement once post angiogram and depending on results. .  Entered: Candelaria Bolivar MD 02/20/2019, 5:22 PM       The patient's care was discussed with the family and cardiologist.    Candelaria Bolivar MD  Hospitalist Service  [facility]    ______________________________________________________________________    Interval History   No further chest pain. No SOB, N/V. Wants to be up and walking a bit. Anxious about pain and glad we are going to proceed with angiogram.     Data reviewed today: I reviewed all medications, new labs and imaging results over the last 24 hours. I personally reviewed no images or EKG's today.    Physical Exam   Vital Signs: Temp: 97.5  F (36.4  C) Temp src: Oral BP: (!) 158/92 Pulse: 61 Heart  Rate: 60 Resp: 16 SpO2: 97 % O2 Device: None (Room air)    Weight: 161 lbs 9.55 oz  Constitutional:  NAD,   Neuropsyche:  alert and oriented, answers questions appropriately.   Respiratory:  Breathing comfortably, good air exchange, no wheezes, no crackles.   Cardiovascular:  Regular rate and rhythm, no edema.  GI:  soft, NT/ND, BS normal  Skin/Integumen:  No acute rash, bruising or sign of bleeding.       Data   Recent Labs   Lab 02/20/19  0930 02/20/19  0606 02/20/19  0305 02/19/19  2335 02/19/19  1900   WBC  --  10.3  --   --  21.0*   HGB  --  15.0  --   --  16.5   MCV  --  90  --   --  90   PLT  --  218  --   --  297   NA  --  141  --   --  138   POTASSIUM  --  3.8  --  4.2 2.9*   CHLORIDE  --  113*  --   --  104   CO2  --  19*  --   --  17*   BUN  --  22  --   --  28   CR  --  1.18  --   --  1.47*   ANIONGAP  --  9  --   --  17*   SHWETA  --  8.3*  --   --  10.0   GLC  --  68*  --   --  111*   ALBUMIN  --   --   --   --  4.5   PROTTOTAL  --   --   --   --  8.3   BILITOTAL  --   --   --   --  0.6   ALKPHOS  --   --   --   --  100   ALT  --   --   --   --  39   AST  --   --   --   --  21   TROPI 0.021  --  0.036 0.026 <0.015     Recent Results (from the past 24 hour(s))   Chest  XR, 1 view PORTABLE    Narrative    CHEST PORTABLE ONE VIEW 2/19/2019 7:45 PM     HISTORY: Chest pain and shortness of breath.    COMPARISON: 8/22/2015      Impression    IMPRESSION:  No acute consolidation.    VIJAY FLORES MD   NM Lexiscan stress test (nuc card)    Narrative    GATED MYOCARDIAL PERFUSION SCINTIGRAPHY WITH INTRAVENOUS PHARMACOLOGIC  VASODILATATION LEXISCAN -ONE DAY STUDY     2/20/2019 10:24 AM  PHOEBE GAMEZ  53 years  Male  1965.    Indication/Clinical History: History of CAD    Impression  1.  Myocardial perfusion imaging using single isotope technique  demonstrated partially fixed basal to mid anteroseptal defect  consistent with nontransmural scar and mild chhaya-infarct ischemia.  There is a second basal inferior  inferior septal and inferolateral  defect which appears mostly fixed.   2. Gated images demonstrated normal LV cavity size and systolic  function.  The left ventricular systolic function is 79%.  3. Compared to the prior study from no prior study .    Procedure  Pharmacologic stress testing was performed with Lexiscan at a rate of  0.08 mg/ml rapid bolus injection, for 15 seconds, 0.4 mg/5ml  intravenously. Low-level exercise was performed along with the  vasodilator infusion.  The heart rate was 64 at baseline and lavinia to  105 beats per minute during the Lexiscan infusion. The rest blood  pressure was 179/95 mmHg and was 158/116 mm Hg during Lexiscan  infusion. The patient experienced no symptoms  during the test.    Myocardial perfusion imaging was performed at rest, approximately 45  minutes after the injection intravenously of 10 mCi of Tc-99m Myoview.  At peak pharmacologic effect, 10-20 seconds after Lexiscan,  the  patient was injected intravenously with 32 mCi of  Tc-99m Myoview. The  post-stress tomographic imaging was performed approximately 60 minutes  after stress.    EKG Findings  The resting EKG demonstrated normal sinus rhythm. The stress EKG  demonstrated no ischemic changes.    Tomographic Findings  Overall, the study quality is adequate . On the stress images, mild to  moderate count reduction is seen in the basal to mid anterior  anteroseptal wall and mild to moderate count reduction is seen in the  basal inferior inferior septal and inferolateral walls. On the rest  images, there is reversibility seen in the anteroseptal wall with  similar fixed pattern of decreased counts in the basal inferior  inferior septal and inferolateral walls. Results suggest a  nontransmural scar of the basal to mid anteroseptal wall with mild  chhaya-infarct ischemia and mostly fixed basal inferior inferior septal  and inferolateral defect . Gated images demonstrated normal LV cavity  size with end-diastolic volumes  measuring 102 and 10 7 mL and rest and  stress respectively. LV systolic function appears normal. The left  ventricular ejection fraction was calculated to be 79%. TID was  absent.    DORY MOSS MD     Medications       aspirin  81 mg Oral Daily     atorvastatin  40 mg Oral At Bedtime     carvedilol  12.5 mg Oral BID w/meals     clopidogrel  75 mg Oral Daily     hydrALAZINE  25 mg Oral Q8H SAPPHIRE     losartan  100 mg Oral Daily     sodium chloride (PF)  10 mL Intravenous Once     sodium chloride (PF)  3 mL Intracatheter Q8H

## 2019-02-20 NOTE — CONSULTS
Care Transition Initial Assessment - SW     Met with: Patient and Family    Active Problems:    * No active hospital problems. *       DATA  Lives With: spouse      Quality of Family Relationships: supportive, involved  Description of Support System: Supportive, Involved  Who is your support system?: Wife  Support Assessment: Adequate family and caregiver support.   Identified issues/concerns regarding health management: discharge planning      Quality of Family Relationships: supportive, involved  Transportation Anticipated: family or friend will provide    ASSESSMENT  Cognitive Status:  awake, alert and oriented    Concerns to be addressed: Per SW consult for discharge planning. Pt admitted 2/19/19 with chest pain, pt holds disability status for advanced osteoarthritis. Discharge pending, pt has angio in the morning. SW met with pt and wife Clarisa at bedside. Pt anticipates going home with his wife at discharge. Pt wife asking SW about services in the home for the future, should pt need assistance. SW informing pt about private pay home care agencies in addition to [facility] programs / waivers. At this time, no recommendations for rehab or additional needs.     Please re-consult SW should further needs arise.      PLAN  Financial costs for the patient includes N/A  Patient given options and choices for discharge Home w/ spouse   Patient/family is agreeable to the plan?  Yes  Patient Goals and Preferences: Home w/ spouse   Patient anticipates discharging to:  Home w/ spouse     ABE Arceo

## 2019-02-20 NOTE — PLAN OF CARE
PRIMARY DIAGNOSIS: CHEST PAIN  OUTPATIENT/OBSERVATION GOALS TO BE MET BEFORE DISCHARGE:  1. Ruled out acute coronary syndrome (negative or stable Troponin):  Yes  2. Pain Status: Pain free.  3. Appropriate provocative testing performed: No, Angio tomorrow  - Stress Test Procedure: NM Lexiscan, will have Angio tomorrow  - Interpretation of cardiac rhythm per telemetry tech: Sinus Nas/SR    4. Cleared by Consultants (if applicable):No, Cards following  5. Return to near baseline physical activity: Yes  Discharge Planner Nurse   Safe discharge environment identified: Yes  Barriers to discharge: No       Entered by: Lilo Kuhn 02/20/2019 4:39 PM     Please review provider order for any additional goals.   Nurse to notify provider when observation goals have been met and patient is ready for discharge.

## 2019-02-20 NOTE — ED NOTES
Bed: ST01  Expected date:   Expected time:   Means of arrival:   Comments:  516  53 M cardiac changes/varying HR  1900

## 2019-02-20 NOTE — PLAN OF CARE
AVSS; tele SB/SR; pt denied pain or shortness of breath overnight; troponins < 0.015,  0.026, and 0.036; MD aware; 0900 troponin ordered; pt NPO after 0300 for NM Lexiscan; up with SBA; voiding; social work consulted.

## 2019-02-20 NOTE — PROGRESS NOTES
Serial troponins and stress test complete. - in progress  - Seen and cleared by consultant if applicable - in progress  - Adequate pain control on oral analgesia - met  - Vital signs normal or at patient baseline - in progress  - Safe disposition plan has been identified - met    Patient A&0x4. Up ind in room. Denies chest pain and discomfort. Skin clammy. Denies nausea. Tele NSR. Lungs clear. No edema noted.

## 2019-02-20 NOTE — PROGRESS NOTES
Hospitalist Cross Cover Note:    I was notified about a troponin elevation, the patients troponin is actually still within normal limits but is detectable.  Has gone up from 0.026 to 0.036 over the past 4 hours.  Will continue to follow although the patient has had 3 negative troponins, will get another level drawn at 0900 this AM.  No change in patients hemodynamic status.       Chet Jason MD  [facility] Hospitalist  Pager [phone]

## 2019-02-20 NOTE — PHARMACY-ADMISSION MEDICATION HISTORY
"Admission medication history interview status for the 2/19/2019  admission is complete. See EPIC admission navigator for prior to admission medications     Medication history source reliability:Good    Actions taken by pharmacist (provider contacted, etc):None     Additional medication history information not noted on PTA med list :None    Medication reconciliation/reorder completed by provider prior to medication history? No    Time spent in this activity: 20\"    Prior to Admission medications    Medication Sig Last Dose Taking? Auth Provider   aspirin (ASA) 81 MG chewable tablet Take 81 mg by mouth daily 2/19/2019 at am Yes Unknown, Entered By History   atorvastatin (LIPITOR) 40 MG tablet Take 40 mg by mouth At Bedtime 2/18/2019 at hs Yes Unknown, Entered By History   clindamycin (CLEOCIN) 300 MG capsule Take 600 mg by mouth daily as needed (dental procedure.) 2 x 300 mg 2 months ago Yes Unknown, Entered By History   clopidogrel (PLAVIX) 75 MG tablet Take 75 mg by mouth daily 2/19/2019 at am Yes Unknown, Entered By History   diclofenac (VOLTAREN) 1 % topical gel Place onto the skin daily as needed (pain in hands)  prn Yes Unknown, Entered By History   losartan (COZAAR) 100 MG tablet Take 100 mg by mouth daily 2/19/2019 at am Yes Unknown, Entered By History   metoprolol tartrate (LOPRESSOR) 25 MG tablet Take 25 mg by mouth 2 times daily 2/19/2019 at am Yes Unknown, Entered By History   nitroGLYcerin (NITROSTAT) 0.4 MG sublingual tablet Place 0.4 mg under the tongue every 5 minutes as needed for chest pain For chest pain place 1 tablet under the tongue every 5 minutes for 3 doses. If symptoms persist 5 minutes after 1st dose call 911. prn Yes Unknown, Entered By History   potassium chloride (KLOR-CON) 20 MEQ packet Take 20 mEq by mouth daily He is currently on the packet 2/19/2019 at am Yes Unknown, Entered By History   triamterene-HCTZ (DYAZIDE) 37.5-25 MG capsule Take 1 capsule by mouth every morning 2/19/2019 at " am Yes Unknown, Entered By History   Vitamin D, Cholecalciferol, 1000 units CAPS Take 1,000 Units by mouth daily 2/19/2019 at am Yes Unknown, Entered By History

## 2019-02-20 NOTE — ED PROVIDER NOTES
History     Chief Complaint:  Chest pain and feeling cold    HPI   Pranav Loo is a 53 year old male with a history of coronary artery disease, LAD stent placement, hypertension, hyperlipidemia, among others who presents with chest pain and feeling cold. The patient was shoveling his roof today when he started to feel tired and came down. He couldn't slow his breathing down and his heart was racing. He told his wife to call EMS who has taken him to the ED for evaluation and treatment. Upon arrival, EMS reports normal blood pressure, sugar of 63, heart rate around 40-70, shortness of breath, and diaphoresis. the patient reports nausea and chest pain. The patient denies any recent travel. Of note: he did have a beer and a shot of alcohol around 1100 today. According to wife, the patient last looked totally normal around 1600.     CARDIAC & PE/DVT RISK FACTORS:  Sex:    Male   Tobacco:   Negative   Hypertension:   Positive   Hyperlipidemia:  Positive   Diabetes:   Negative   Hormones:   Negative   Cancer:   Negative   Travel:   Negative   Surgery:   Negative   Other immobilization: Negative   Personal history:  Positive ( coronary artery disease, stent in LAD artery )  Family history:  Positive ( coronary artery disease )     Allergies:  penicillin G  Adhesive   amlodipine    Medications:    apresoline  hydrodiuril   Cleocin  Lioresal  cymbalta  Plavix  lipitor  Cozaar  Nitrostat  Dyazide  Lopressor    Past Medical History:    degenerative joint disease  Hypertension   Postprocedural status  Osteoarthritis   Acute renal insufficiency   Hyperlipidemia  Degenerative disc disease  Coronary artery disease  Viral meningitis     Past Surgical History:    arthroplasty  Pelvis/hip joint  Bilateral cataracts  Optical tracking system resurfacing hip  Stent in LAD artery    Family History:    coronary artery disease   Prostate cancer    Social History:  Marital Status:     Smoker:   Never   Smokeless:   No   Alcohol:  "  Yes, today around 11. Couple times a week usually    Drugs:   Marijuana a couple times a week   Lives at:   Home   Arrives with:   Wife   Clinic:    Unknown   Work:   Unknown      Review of Systems   Unable to perform ROS: Mental status change   Respiratory: Positive for shortness of breath.    Cardiovascular: Positive for chest pain.       Physical Exam     Patient Vitals for the past 24 hrs:   BP Temp Temp src Pulse Heart Rate Resp SpO2 Height Weight   02/19/19 2112 (!) 164/108 95  F (35  C) -- 82 -- 16 97 % -- --   02/19/19 2100 (!) 162/101 -- -- 84 80 15 96 % -- --   02/19/19 2058 (!) 162/101 -- -- 84 87 -- -- -- --   02/19/19 2030 (!) 149/106 -- -- 82 84 14 98 % -- --   02/19/19 2005 -- 97.4  F (36.3  C) Oral -- -- -- -- -- --   02/19/19 1940 150/89 -- -- -- 85 21 99 % -- --   02/19/19 1930 147/86 -- -- 86 86 20 100 % -- --   02/19/19 1920 133/82 -- -- 81 84 11 100 % -- --   02/19/19 1902 (!) 165/95 92.9  F (33.8  C) Rectal -- 80 16 100 % 1.727 m (5' 8\") 73.3 kg (161 lb 9.6 oz)     Physical Exam  Nursing note and vitals reviewed.  Constitutional:  altered mental status, drowsy, shivering  HENT:   Head:    Atraumatic.   Mouth/Throat:   Oropharynx is clear and moist. No oropharyngeal exudate.   Eyes:    Pupils are equal, round, and reactive to light.   Neck:    Normal range of motion. Neck supple.      No tracheal deviation present. No thyromegaly present.   Cardiovascular:  Normal rate, regular rhythm, no murmur   Pulmonary/Chest: Breath sounds are clear and equal without wheezes or crackles.  Abdominal:   Soft. Bowel sounds are normal. Exhibits no distension and      no mass. There is no tenderness.      There is no rebound and no guarding.   Musculoskeletal:  Exhibits no edema.   Lymphadenopathy:  No cervical adenopathy.   Neurological:   Alert and oriented to person, place, and time.   Skin:    Skin is cold and diaphoretic. No rash noted. No pallor.     Emergency Department Course   ECG:  Indication: " tachycardia   Time: 1903  Vent. Rate 80 bpm. UT interval 186. QRS duration 104. QT/QTc 440/507. P-R-T axis 63 39 68. Normal sinus rhythm. Possible left atrial enlargement. RSR' or QR pattern in V1 suggests right ventricular conduction delay. Left ventricular hypertrophy. Nonspecific ST abnormality. Prolonged QT. Abnormal ECG. Read time: 1904    Imaging:  Radiographic findings were communicated with the patient and family who voiced understanding of the findings.    XR Chest port 1 view:   No acute consolidation. as per radiology.     Laboratory:  Glucose by meter: 161  Magnesium: 2.4  CMP: Glucose 111, potassium 2.9, carbon dioxide 17, anion gap 17, creatinine 1.47, GFR 54, o/w WNL  1900 Troponin: <0.015  Alcohol ethyl: 0.04  CBC: WBC: 21.0, HGB: 16.5, PLT: 297    Interventions:  1910: Zofran 4 mg IV  1911: Dextrose 50% 50 mLs IV infusion  1916: NS 1L IV Bolus   1917: Aspirin 324 mg PO  2021: Potassium chloride 40 mEq PO    Emergency Department Course:  (1856) I performed an exam of the patient as documented above.     1859 Transferred to bed  1900 Electrodes placed  1900 Blood drawn  1903 ECG drawn  1903 Oral temperature taken, did not work    1904 Rectal temp taken 92.9  1905 Faby hugger placed as well as 3 normal blankets on top   1906 Faby hugger hooked up to pump and turned on.    (1940) I rechecked the patient and discussed the results of their workup thus far.   (2032)  I consulted with Dr. Jason of the hospitalist services. They are in agreement to accept the patient for admission.    Findings and plan explained to the Patient and spouse who consents to admission. Discussed the patient with Dr. Jason, who will admit the patient to a OBS bed for further monitoring, evaluation, and treatment.    Impression & Plan    Medical Decision Making:  Pranav Loo is a 53 years old female who I found to have acute hypothermia which was affectively treated during the ED course with the bear hugger, warm blankets, and  warm IV fluids. However, I felt he required hospitalization to have further cardiac workup as he was having some symptoms of chest pain, weakness, and shortness of breath which are concerning for acute unstable angina and he does have a history coronary disease and stents. His initial troponin was normal and electrocardiogram shows nonspecific changes but no definite acute myocardial infarction. He is admitted under the care of Dr. Jason, the hospitalist in the observation unit. I do no think there is concern for a pulmonary embolism or aortic dissection.     Critical Care time:  was 35 minutes for this patient excluding procedures.    Diagnosis:    ICD-10-CM    1. Chest pain R07.9    2. Hypothermia, initial encounter T68.XXXA      Disposition:  Admitted to OBS    Scribe Disclosure:  I, Iker Marino, am serving as a scribe on 2/19/2019 at 6:56 PM to personally document services performed by Elvira Darling MD providers found based on my observations and the provider's statements to me.     Iker Marino  2/19/2019    EMERGENCY DEPARTMENT       Elvira Darling MD  02/19/19 3591

## 2019-02-20 NOTE — PLAN OF CARE
PRIMARY DIAGNOSIS: CHEST PAIN  OUTPATIENT/OBSERVATION GOALS TO BE MET BEFORE DISCHARGE:  1. Ruled out acute coronary syndrome (negative or stable Troponin):  Not yet, 0.015, 0.026, 0.036, 0.021  2. Pain Status: Pain free.  3. Appropriate provocative testing performed: Yes  - Stress Test Procedure: Lesiscan  - Interpretation of cardiac rhythm per telemetry tech: NSR     4. Cleared by Consultants (if applicable):No  5. Return to near baseline physical activity: Yes  Discharge Planner Nurse   Safe discharge environment identified: Yes  Barriers to discharge: Yes, BP elevated           Please review provider order for any additional goals.     A/O. Up independently. LS clear. BS active. CMS intact. VSS ex HTN. Denies pain. PIV running 100 NS. Tele NSR. Cardiology to consult.

## 2019-02-20 NOTE — PLAN OF CARE
PRIMARY DIAGNOSIS: CHEST PAIN  OUTPATIENT/OBSERVATION GOALS TO BE MET BEFORE DISCHARGE:  1. Ruled out acute coronary syndrome (negative or stable Troponin):  Not yet, 0.015, 0.026, 0.036, 0.021  2. Pain Status: Pain free.  3. Appropriate provocative testing performed: Yes  - Stress Test Procedure: Lesiscan  - Interpretation of cardiac rhythm per telemetry tech: NSR    4. Cleared by Consultants (if applicable):No  5. Return to near baseline physical activity: Yes  Discharge Planner Nurse   Safe discharge environment identified: Yes  Barriers to discharge: Yes, BP elevated       Entered by: Jose Alberto Fletcher 02/20/2019 10:34 AM     Please review provider order for any additional goals.   Nurse to notify provider when observation goals have been met and patient is ready for discharge.  A&Ox4, AVSS except elevated BP, SBA, IV  ml/hr, denies pain, dizziness, SOB. Lesiscan complete -awaiting results. Continue to monitor.

## 2019-02-20 NOTE — PROGRESS NOTES
Observation Goals:    - Serial troponins and stress test complete. - not met  - Seen and cleared by consultant if applicable - not met  - Adequate pain control on oral analgesia - NA; pt denying pain  - Vital signs normal or at patient baseline - met  - Safe disposition plan has been identified - not met

## 2019-02-20 NOTE — PROVIDER NOTIFICATION
Call placed to Dr. Jason regarding 2330 troponin of 0.026 up from < 0.015; pt pain free and next troponin due at 0300.

## 2019-02-20 NOTE — H&P
Admitted:     02/19/2019      PRIMARY CARE PHYSICIAN:  Dr. Joe Dudley of [facility] in [place].      PRIMARY CARDIOLOGIST:  Dr. Hira Capellan.      CHIEF COMPLAINT:  Chest pain.      HISTORY OF PRESENT ILLNESS:  Mr. Pranav Loo is a delightful 53-year-old  gentleman with a past medical history notable for coronary artery disease, status post drug-eluting stent to the LAD in 01/2016, hypertension, dyslipidemia, chronic kidney disease stage III with a baseline creatinine in the range of 1.1-1.2, osteoarthritis of the hip, and vitamin D deficiency, who has presented to the Emergency Department at [facility] via EMS for evaluation of chest pain.  The patient states he was shoveling his roof all day and then came inside around 3:30 pm to rest.  At that time, he felt cold, sweaty, and had intermittent precordial chest pressure, 5-6/10 in intensity.  Reportedly, he could not slow his breathing down and his heart was racing.  On direct questioning, he denies any fever, chills, cough, sputum, nausea, vomiting, or other complaints.  The pain in his chest was not radiating to the back, jaw, or left arm.  Due to persistence of the symptoms, EMS was summoned, and he was transferred to the Emergency Department at [facility].  According to the EMS report, his heart rate was bouncing between 40 and 60.  In the Emergency Department, the patient was noted to be hypothermic with a temperature of 92.9 degrees Fahrenheit.  Therefore, he was wrapped with a Faby Hugger and received warm IV normal saline.  His temperature is currently up to 97.4 degrees Fahrenheit.  In the ER, the patient has been evaluated by Dr. Elvira Darling, the ER attending physician.  Besides hypothermia upon arrival, he has stable vital signs.  The troponin I is less than 0.015.  The chemistry profile is significant for a low potassium level of 2.9 and elevated creatinine of 1.47.  The liver function panel is  normal.  The hematology profile shows a white count of 21,000 and a hemoglobin of 16.  The chest x-ray shows no active cardiopulmonary disease.  The ethanol level is 0.04.  The electrocardiogram reveals a normal sinus rhythm with a rate of 80 beats per minute, LVH, nonspecific ST abnormality, and RV conduction delay.  In the ER, the patient has been treated with aspirin.  He is being admitted to the hospital under an observation status.      PAST MEDICAL HISTORY:   1.  Coronary artery disease, status post drug-eluting stent to LAD in 2016.   2.  Hypertension.   3.  Dyslipidemia.   4.  Chronic kidney disease, stage III, baseline creatinine in the range of 1.1-1.2.   5.  Vitamin D deficiency.   6.  Osteoarthritis of the hip, the patient is on disability.   7.  History of viral meningitis in .   8.  Degenerative disk disease of the lumbar spine.      PAST SURGICAL HISTORY:   1.  Bilateral cataract removal.   2.  Pelvis/hip joint surgery.   3.  Hip replacement.      FAMILY HISTORY:  Mother is still living and has a history of osteoarthritis.  Father  of lung cancer at the age of 74.  One brother had a history of a premature myocardial infarction at the age of 41.      SOCIAL HISTORY:  The patient is .  He is currently on disability due to his advanced osteoarthritis.  He does use a cane for ambulation.  He denies smoking or using recreational drugs.  On average, he drinks 5 beers a week.      MEDICATIONS:   Prior to Admission Medications   Prescriptions Last Dose Informant Patient Reported? Taking?   Vitamin D, Cholecalciferol, 1000 units CAPS 2019 at am Self Yes Yes   Sig: Take 1,000 Units by mouth daily   aspirin (ASA) 81 MG chewable tablet 2019 at am Self Yes Yes   Sig: Take 81 mg by mouth daily   atorvastatin (LIPITOR) 40 MG tablet 2019 at hs Self Yes Yes   Sig: Take 40 mg by mouth At Bedtime   clindamycin (CLEOCIN) 300 MG capsule 2 months ago Self Yes Yes   Sig: Take 600 mg by  mouth daily as needed (dental procedure.) 2 x 300 mg   clopidogrel (PLAVIX) 75 MG tablet 2/19/2019 at am Self Yes Yes   Sig: Take 75 mg by mouth daily   diclofenac (VOLTAREN) 1 % topical gel prn Self Yes Yes   Sig: Place onto the skin daily as needed (pain in hands)    losartan (COZAAR) 100 MG tablet 2/19/2019 at am Self Yes Yes   Sig: Take 100 mg by mouth daily   metoprolol tartrate (LOPRESSOR) 25 MG tablet 2/19/2019 at am Self Yes Yes   Sig: Take 25 mg by mouth 2 times daily   nitroGLYcerin (NITROSTAT) 0.4 MG sublingual tablet prn Self Yes Yes   Sig: Place 0.4 mg under the tongue every 5 minutes as needed for chest pain For chest pain place 1 tablet under the tongue every 5 minutes for 3 doses. If symptoms persist 5 minutes after 1st dose call 911.   potassium chloride (KLOR-CON) 20 MEQ packet 2/19/2019 at am Self Yes Yes   Sig: Take 20 mEq by mouth daily He is currently on the packet   triamterene-HCTZ (DYAZIDE) 37.5-25 MG capsule 2/19/2019 at am Self Yes Yes   Sig: Take 1 capsule by mouth every morning      Facility-Administered Medications: None        ALLERGIES AND INTOLERANCES:  PENICILLIN, WITH UNKNOWN REACTION, DURING CHILDHOOD.      REVIEW OF SYSTEMS:  A 10-point review of system was performed thoroughly, and it was negative except as stated in the history of present illness.      PHYSICAL EXAMINATION:   GENERAL:  This patient is a very pleasant gentleman who is in no acute distress.   VITAL SIGNS:  Blood pressure 150/89, heart rate 85, respiratory rate 21, temperature initially 92.9, now up to 97.4 degrees Fahrenheit, oxygen saturation 97%.   HEENT:  The pupils are round, equal, reactive to light bilaterally.  There is no conjunctival pallor or scleral icterus.  The oral mucosa appears slightly dry.   NECK:  Supple.  No elevated JVP.   LUNGS:  Clear to auscultation bilaterally.   CARDIOVASCULAR:  There is a normal S1 and S2 with a regular rate and rhythm.   CHEST WALL:  There is no tenderness to  palpation.   ABDOMEN:  Soft, nontender, nondistended.  Bowel sounds are present.   EXTREMITIES:  There is no calf tenderness or lower extremity edema.   SKIN:  There is no jaundice, cyanosis, or acute rash.   NEUROLOGIC:  He is awake, alert and oriented x 3.  There is no focal deficit on gross examination.      LABORATORY DATA:   1.  Chemistry profile:  Sodium 138, potassium 2.9, creatinine 1.47, ALT 39, AST 21, total bilirubin 0.6, glucose 161.  Troponin I less than 0.015.   2.  Hematology profile:  White count 21,000, hemoglobin 16.5, platelet count 297,000, MCV 90, 78.5% neutrophils.     3.  Ethanol level is 0.04.   4.  Chest x-ray:  It shows no active cardiopulmonary disease.   5.  A 12-lead electrocardiogram:  Reveals a normal sinus rhythm, a rate of 80 beats per minute, LVH, nonspecific ST abnormality, and a right ventricular conduction delay.      ASSESSMENT AND PLAN:  Mr. Pranav Loo is a delightful 53-year-old  gentleman with a past medical history notable for coronary artery disease, hypertension, dyslipidemia, degenerative disk disease of the lumbar spine, osteoarthritis of the hip, and chronic kidney disease stage III, who has presented to the Emergency Department with the complaint of chest pain after shoveling snow.   1.  Chest pain:  It is described as intermittent precordial chest pressure, 5-6/10 in severity, associated with diaphoresis and feeling cold, but no radiation to the back, jaw, or left arm.  HIs symptoms of excessive sweating and feeling cold are likely due to hypothermia, in the setting working outside shoveling snow.  He has a known history of coronary artery disease and is status post a drug-eluting stent to the LAD in 01/2016.  The patient has no recent cardiac stress test.  The electrocardiogram reveals a normal sinus rhythm, LVH, nonspecific ST abnormality, and RV conduction delay.  Troponin I is less than 0.015 and chest x-ray shows no active cardiopulmonary disease.   Liver function panel is normal and I have no suspicion for pulmonary embolism.  At this juncture, the etiology of chest pain is unclear, but given his known history of coronary disease and development of the chest pain after exertion, angina is a primary concern.  Presently, he is chest pain-free and does not appear to have unstable angina.  Management as below:     a.  Admission to observation unit.     b.  Telemetry.   c.  Serial cardiac enzymes.     d.  Continue his prior to admission aspirin, Plavix, Lipitor, metoprolol, and Cozaar.   e.  Nuclear Lexiscan stress test in the morning if the patient is ruled out for an acute myocardial infarction.   f.  P.r.n. acetaminophen and morphine are available.   2.  Hypothermia:  Upon arrival, the patient was hypothermic with a temperature of 92.9 degrees Fahrenheit due to shoveling snow outside for several hours.  His temperature has improved to 97.4 after treatment with a Faby Hugger and warm normal saline.  He will continue with the Faby Hugger for now.   3.  Acute kidney injury:  The patient has a known history of chronic kidney disease stage III with a baseline creatinine in the range of 1.1-1.2.  His creatinine today is 1.47 with an elevated BUN of 28.  I suspect this is prerenal azotemia due to volume depletion after excessive exertion.  He will be treated with intravenous normal saline.  We will avoid NSAIDS or other nephrotoxins.  It is okay to continue his prior to admission losartan but I will hold his prior to admission Dyazide.   4.  Hypokalemia:  The potassium level is 2.9.  Of note, he is on Dyazide 37.5-25 mg p.o. daily, which will be held.  In the ER, the patient has received 40 mEq of potassium chloride.  I will place the patient on a potassium replacement protocol.  His potassium level will be monitored.   5.  Hyperglycemia:  The blood glucose is 161 in the ER, which is a nonfasting level.  I will check a hemoglobin A1c to rule out the possibility of  prediabetes.   6.  Hypertension:  The blood pressure is currently controlled.  He will continue on his prior to admission losartan 100 po daily and metoprolol 25 mg po bid.  I will hold his prior to admission Dyazide 37.5-25 mg daily due to acute kidney injury.   7.  Dyslipidemia:  He will continue on his prior to admission atorvastatin 40 mg p.o. at bedtime.  I will check a fasting lipid profile.   8.  Leukocytosis:  White blood cells are elevated at 21,000. On exam there is no localizing infection, I suspect this is stress mediated. I will continue to monitor the white count.  9.  Deep venous thrombosis prophylaxis:  The patient is ambulatory, and a short hospitalization is anticipated.  Therefore, there is no indication for chemical or mechanical VTE prophylaxis.   10.  Code status:  It was discussed with the patient and he requested to be full code.   11.  Disposition:  I anticipate discharge within 24 hours, pending completion of the cardiac workup.      I would like to thank Dr. Joe Dudley for allowing the Hospitalist Service to participate in the care of this patient.         JOY JUNG MD             D: 2019   T: 2019   MT: SAGE      Name:     PHOEBE GAMEZ   MRN:      [id number]        Account:      [id number]   :      1965        Admitted:     2019                   Document: [id number]       cc: Joe Dudley MD

## 2019-02-20 NOTE — PROGRESS NOTES
RECEIVING UNIT ED HANDOFF REVIEW    ED Nurse Handoff Report was reviewed by: Eri Borges on February 19, 2019 at 9:08 PM

## 2019-02-20 NOTE — CONSULTS
Cardiology Consultation      Pranav Loo MRN# [id number]   YOB: 1965 Age: 53 year old   Date of Admission: 2/19/2019     Reason for consult: Chest discomfort, CAD           Assessment and Plan:     53-year-old gentleman with a history of coronary artery disease status post YAMIL to the LAD 2016, poorly controlled HTN and dyslipidemia who presented with:       1. Chest discomfort post exertion associated with diaphoresis  2. Abnormal nuclear stress test consistent with mild anteroseptal ischemia  3. Moderate to severe hypertension  4. Acute kidney injury, now resolving    PLAN  -  The patient's symptoms are similar to those that prompted his coronary angiography in 2016. In addition, his nuclear stress demonstrates a small area of mid anteroseptal ischemia.   Given these findings I have recommended coronary angiography for definitive evaluation of his coronary anatomy and revascularization if clinically indicated.  I have explained indication risks and benefits to the patient in detail who is agreeable to proceeding.  -Regarding his hypertension, he has a previous intolerance to amlodipine and apparently developed bradycardia with an increased dose of metoprolol.    He is currently off his diuretic due to his acute renal insufficiency upon presentation.  Aldactone would be a reasonable long-term strategy once his renal function normalizes, but I do not wish to initiate it at this time given his acute renal insufficiency upon presentation and upcoming coronary angiogram.  As a temporizing measure I have initiated hydralazine 25 mg 3 times daily.                Chief Complaint:   Chest Pain (shoveling roof, chest pain, pale diaphoretic, )           History of Present Illness:   This patient is a 53 year old male who is followed at [facility].  He has a history of coronary artery disease and is status post drug-eluting stent placement to the LAD in 2016 at which point he was  "experiencing exertional chest discomfort.  He had mild nonobstructive disease elsewhere.  His symptoms resolved post intervention and he is known to have normal left ventricular systolic function on his most recent echocardiogram with no evidence of significant valvular disease.  His main cardiac risk factor is poorly controlled hypertension, which is complicated by previous intolerance to amlodipine and bradycardia on increased dose of beta-blockers.  He presented to [facility] yesterday with complaints of substernal chest tightness, diaphoresis and dyspnea that occurred after he was shoveling his roof.  He describes his symptoms as being identical to what prompted his cardiac evaluation back in 2016.  Interestingly upon presentation to the emergency department he was noted to be quite hypothermic with a temperature of 92.9  F.  Cardiac troponins were negative but his white count was elevated and he was found to be hypokalemic with acute renal insufficiency.  His diuretics were held and he received IV fluids.  His chest discomfort resolved spontaneously.  Currently he feels well.  She denies any chest discomfort palpitations syncope or presyncope.  This appears to be the first episode of chest discomfort he has had since his coronary angiogram in 2016.         Physical Exam:   Vitals were reviewed  Blood pressure (!) 170/99, pulse 61, temperature 98.8  F (37.1  C), temperature source Oral, resp. rate 16, height 1.727 m (5' 8\"), weight 73.3 kg (161 lb 9.6 oz), SpO2 97 %.  Temperatures:  Current - Temp: 98.8  F (37.1  C); Max - Temp  Av.9  F (36.1  C)  Min: 92.9  F (33.8  C)  Max: 98.8  F (37.1  C)  Respiration range: Resp  Av.4  Min: 11  Max: 21  Pulse range: Pulse  Av.6  Min: 61  Max: 86  Blood pressure range: Systolic (24hrs), Av , Min:133 , Max:179   ; Diastolic (24hrs), Av, Min:82, Max:116    Pulse oximetry range: SpO2  Av.2 %  Min: 96 %  Max: 100 %    Intake/Output Summary " (Last 24 hours) at 2/20/2019 1551  Last data filed at 2/20/2019 0932  Gross per 24 hour   Intake 993 ml   Output --   Net 993 ml     Constitutional:   awake, alert, cooperative, no apparent distress, and appears stated age     Eyes:   Lids and lashes normal, pupils equal, round and reactive to light, extra ocular muscles intact, sclera clear, conjunctiva normal     Neck:   supple, symmetrical, trachea midline, no JVD     Back:   symmetric     Lungs:   No increased work of breathing, good air exchange, clear to auscultation bilaterally, no crackles or wheezing     Cardiovascular:   Normal apical impulse, regular rate and rhythm, normal S1 and S2, no S3 or S4, and no murmur noted.        Abdomen:   non-tender     Musculoskeletal:   motor strength is 5 out of 5 all extremities bilaterally     Neurologic:   Grossly nonfocal     Skin:   no bruising or bleeding     Additional findings:     No edema               Past Medical History:   I have reviewed this patient's past medical history  Past Medical History:   Diagnosis Date     DJD (degenerative joint disease)     left hip     Hypertension              Past Surgical History:   I have reviewed this patient's past surgical history  Past Surgical History:   Procedure Laterality Date     ARTHROPLASTY HIP  11/5/2012    Procedure: ARTHROPLASTY HIP;  Right Total Hip Arthroplasty ;  Surgeon: Reagan Ritter MD;  Location:  OR      PELVIS/HIP JOINT SURGERY UNLISTED       EXTRACAPSULAR CATARACT EXTRATION WITH INTRAOCULAR LENS IMPLANT      bilateral     OPTICAL TRACKING SYSTEM RESURFACING HIP  3/30/2011    Procedure:OPTICAL TRACKING SYSTEM RESURFACING HIP; Surgeon:REAGAN RITTER; Location: OR               Social History:   I have reviewed this patient's social history  Social History     Tobacco Use     Smoking status: Never Smoker   Substance Use Topics     Alcohol use: Yes     Comment: couple times a week             Family History:   I have reviewed this patient's  family history  Family History   Problem Relation Age of Onset     Coronary Artery Disease Early Onset Brother 41             Allergies:     Allergies   Allergen Reactions     Penicillin G      Unknown reaction during childhood             Medications:   I have reviewed this patient's current medications  Medications Prior to Admission   Medication Sig Dispense Refill Last Dose     aspirin (ASA) 81 MG chewable tablet Take 81 mg by mouth daily   2/19/2019 at am     atorvastatin (LIPITOR) 40 MG tablet Take 40 mg by mouth At Bedtime   2/18/2019 at hs     clindamycin (CLEOCIN) 300 MG capsule Take 600 mg by mouth daily as needed (dental procedure.) 2 x 300 mg   2 months ago     clopidogrel (PLAVIX) 75 MG tablet Take 75 mg by mouth daily   2/19/2019 at am     diclofenac (VOLTAREN) 1 % topical gel Place onto the skin daily as needed (pain in hands)    prn     losartan (COZAAR) 100 MG tablet Take 100 mg by mouth daily   2/19/2019 at am     metoprolol tartrate (LOPRESSOR) 25 MG tablet Take 25 mg by mouth 2 times daily   2/19/2019 at am     nitroGLYcerin (NITROSTAT) 0.4 MG sublingual tablet Place 0.4 mg under the tongue every 5 minutes as needed for chest pain For chest pain place 1 tablet under the tongue every 5 minutes for 3 doses. If symptoms persist 5 minutes after 1st dose call 911.   prn     potassium chloride (KLOR-CON) 20 MEQ packet Take 20 mEq by mouth daily He is currently on the packet   2/19/2019 at am     triamterene-HCTZ (DYAZIDE) 37.5-25 MG capsule Take 1 capsule by mouth every morning   2/19/2019 at am     Vitamin D, Cholecalciferol, 1000 units CAPS Take 1,000 Units by mouth daily   2/19/2019 at am     Current Facility-Administered Medications Ordered in Epic   Medication Dose Route Frequency Last Rate Last Dose     acetaminophen (TYLENOL) Suppository 650 mg  650 mg Rectal Q4H PRN         acetaminophen (TYLENOL) tablet 650 mg  650 mg Oral Q4H PRN         alum & mag hydroxide-simethicone (MYLANTA ES/MAALOX   ES) suspension 30 mL  30 mL Oral Q4H PRN         aspirin (ASA) chewable tablet 81 mg  81 mg Oral Daily   81 mg at 02/20/19 0759     atorvastatin (LIPITOR) tablet 40 mg  40 mg Oral At Bedtime   40 mg at 02/19/19 2159     carvedilol (COREG) tablet 12.5 mg  12.5 mg Oral BID w/meals   12.5 mg at 02/20/19 0933     clopidogrel (PLAVIX) tablet 75 mg  75 mg Oral Daily   75 mg at 02/20/19 0800     hydrALAZINE (APRESOLINE) tablet 25 mg  25 mg Oral Q8H SAPPHIRE         labetalol (NORMODYNE/TRANDATE) injection 10 mg  10 mg Intravenous Q2H PRN         lidocaine (LMX4) cream   Topical Q1H PRN         lidocaine 1 % 0.1-1 mL  0.1-1 mL Other Q1H PRN         LORazepam (ATIVAN) tablet 0.5 mg  0.5 mg Oral TID PRN         losartan (COZAAR) tablet 100 mg  100 mg Oral Daily   100 mg at 02/20/19 0800     melatonin tablet 1 mg  1 mg Oral At Bedtime PRN         morphine (PF) injection 1 mg  1 mg Intravenous Q2H PRN         naloxone (NARCAN) injection 0.1-0.4 mg  0.1-0.4 mg Intravenous Q2 Min PRN         nitroGLYcerin (NITROSTAT) sublingual tablet 0.4 mg  0.4 mg Sublingual Q5 Min PRN         ondansetron (ZOFRAN) injection 4 mg  4 mg Intravenous Q6H PRN        Or     ondansetron (ZOFRAN-ODT) ODT tab 4 mg  4 mg Oral Q6H PRN         potassium chloride (KLOR-CON) Packet 20-40 mEq  20-40 mEq Oral or Feeding Tube Q2H PRN         potassium chloride 10 mEq in 100 mL intermittent infusion with 10 mg lidocaine  10 mEq Intravenous Q1H PRN         potassium chloride 10 mEq in 100 mL sterile water intermittent infusion (premix)  10 mEq Intravenous Q1H PRN         potassium chloride ER (K-DUR/KLOR-CON M) CR tablet 20-40 mEq  20-40 mEq Oral Q2H PRN         sodium chloride (PF) 0.9% PF flush 1-10 mL  1-10 mL Intravenous Q10 Min PRN         sodium chloride (PF) 0.9% PF flush 10 mL  10 mL Intravenous Once         sodium chloride (PF) 0.9% PF flush 3 mL  3 mL Intracatheter q1 min prn         sodium chloride (PF) 0.9% PF flush 3 mL  3 mL Intracatheter Q8H          sodium chloride 0.9% infusion   Intravenous Continuous 100 mL/hr at 02/20/19 0705       No current [facility]-ordered outpatient medications on file.             Review of Systems:   The 10 point Review of Systems is negative other than noted in the HPI            Data:   All laboratory data reviewed  Results for orders placed or performed during the hospital encounter of 02/19/19 (from the past 24 hour(s))   CBC with platelets differential   Result Value Ref Range    WBC 21.0 (H) 4.0 - 11.0 10e9/L    RBC Count 5.16 4.4 - 5.9 10e12/L    Hemoglobin 16.5 13.3 - 17.7 g/dL    Hematocrit 46.3 40.0 - 53.0 %    MCV 90 78 - 100 fl    MCH 32.0 26.5 - 33.0 pg    MCHC 35.6 31.5 - 36.5 g/dL    RDW 12.9 10.0 - 15.0 %    Platelet Count 297 150 - 450 10e9/L    Diff Method Automated Method     % Neutrophils 78.5 %    % Lymphocytes 16.0 %    % Monocytes 5.0 %    % Eosinophils 0.0 %    % Basophils 0.1 %    % Immature Granulocytes 0.4 %    Nucleated RBCs 0 0 /100    Absolute Neutrophil 16.5 (H) 1.6 - 8.3 10e9/L    Absolute Lymphocytes 3.4 0.8 - 5.3 10e9/L    Absolute Monocytes 1.1 0.0 - 1.3 10e9/L    Absolute Eosinophils 0.0 0.0 - 0.7 10e9/L    Absolute Basophils 0.0 0.0 - 0.2 10e9/L    Abs Immature Granulocytes 0.1 0 - 0.4 10e9/L    Absolute Nucleated RBC 0.0    Troponin I   Result Value Ref Range    Troponin I ES <0.015 0.000 - 0.045 ug/L   Comprehensive metabolic panel   Result Value Ref Range    Sodium 138 133 - 144 mmol/L    Potassium 2.9 (L) 3.4 - 5.3 mmol/L    Chloride 104 94 - 109 mmol/L    Carbon Dioxide 17 (L) 20 - 32 mmol/L    Anion Gap 17 (H) 3 - 14 mmol/L    Glucose 111 (H) 70 - 99 mg/dL    Urea Nitrogen 28 7 - 30 mg/dL    Creatinine 1.47 (H) 0.66 - 1.25 mg/dL    GFR Estimate 54 (L) >60 mL/min/[1.73_m2]    GFR Estimate If Black 62 >60 mL/min/[1.73_m2]    Calcium 10.0 8.5 - 10.1 mg/dL    Bilirubin Total 0.6 0.2 - 1.3 mg/dL    Albumin 4.5 3.4 - 5.0 g/dL    Protein Total 8.3 6.8 - 8.8 g/dL    Alkaline Phosphatase 100 40 - 150 U/L     ALT 39 0 - 70 U/L    AST 21 0 - 45 U/L   Alcohol ethyl   Result Value Ref Range    Ethanol g/dL 0.04 (H) <0.01 g/dL   Magnesium   Result Value Ref Range    Magnesium 2.4 (H) 1.6 - 2.3 mg/dL   Glucose by meter   Result Value Ref Range    Glucose 161 (H) 70 - 99 mg/dL   Chest  XR, 1 view PORTABLE    Narrative    CHEST PORTABLE ONE VIEW 2/19/2019 7:45 PM     HISTORY: Chest pain and shortness of breath.    COMPARISON: 8/22/2015      Impression    IMPRESSION:  No acute consolidation.    VIJAY FLORES MD   Troponin I - Now then in 4 hours x 2   Result Value Ref Range    Troponin I ES 0.026 0.000 - 0.045 ug/L   Hemoglobin A1c   Result Value Ref Range    Hemoglobin A1C 6.1 (H) 0 - 5.6 %   Potassium   Result Value Ref Range    Potassium 4.2 3.4 - 5.3 mmol/L   Troponin I - Now then in 4 hours x 2   Result Value Ref Range    Troponin I ES 0.036 0.000 - 0.045 ug/L   Lipid profile   Result Value Ref Range    Cholesterol 129 <200 mg/dL    Triglycerides 103 <150 mg/dL    HDL Cholesterol 46 >39 mg/dL    LDL Cholesterol Calculated 62 <100 mg/dL    Non HDL Cholesterol 83 <130 mg/dL   CBC with platelets   Result Value Ref Range    WBC 10.3 4.0 - 11.0 10e9/L    RBC Count 4.70 4.4 - 5.9 10e12/L    Hemoglobin 15.0 13.3 - 17.7 g/dL    Hematocrit 42.2 40.0 - 53.0 %    MCV 90 78 - 100 fl    MCH 31.9 26.5 - 33.0 pg    MCHC 35.5 31.5 - 36.5 g/dL    RDW 13.2 10.0 - 15.0 %    Platelet Count 218 150 - 450 10e9/L   Basic metabolic panel   Result Value Ref Range    Sodium 141 133 - 144 mmol/L    Potassium 3.8 3.4 - 5.3 mmol/L    Chloride 113 (H) 94 - 109 mmol/L    Carbon Dioxide 19 (L) 20 - 32 mmol/L    Anion Gap 9 3 - 14 mmol/L    Glucose 68 (L) 70 - 99 mg/dL    Urea Nitrogen 22 7 - 30 mg/dL    Creatinine 1.18 0.66 - 1.25 mg/dL    GFR Estimate 70 >60 mL/min/[1.73_m2]    GFR Estimate If Black 81 >60 mL/min/[1.73_m2]    Calcium 8.3 (L) 8.5 - 10.1 mg/dL   Troponin I   Result Value Ref Range    Troponin I ES 0.021 0.000 - 0.045 ug/L   NM Lexiscan  stress test (nuc card)    Narrative    GATED MYOCARDIAL PERFUSION SCINTIGRAPHY WITH INTRAVENOUS PHARMACOLOGIC  VASODILATATION LEXISCAN -ONE DAY STUDY     2/20/2019 10:24 AM  PHOEBE GAMEZ  53 years  Male  1965.    Indication/Clinical History: History of CAD    Impression  1.  Myocardial perfusion imaging using single isotope technique  demonstrated partially fixed basal to mid anteroseptal defect  consistent with nontransmural scar and mild chhaya-infarct ischemia.  There is a second basal inferior inferior septal and inferolateral  defect which appears mostly fixed.   2. Gated images demonstrated normal LV cavity size and systolic  function.  The left ventricular systolic function is 79%.  3. Compared to the prior study from no prior study .    Procedure  Pharmacologic stress testing was performed with Lexiscan at a rate of  0.08 mg/ml rapid bolus injection, for 15 seconds, 0.4 mg/5ml  intravenously. Low-level exercise was performed along with the  vasodilator infusion.  The heart rate was 64 at baseline and lavinia to  105 beats per minute during the Lexiscan infusion. The rest blood  pressure was 179/95 mmHg and was 158/116 mm Hg during Lexiscan  infusion. The patient experienced no symptoms  during the test.    Myocardial perfusion imaging was performed at rest, approximately 45  minutes after the injection intravenously of 10 mCi of Tc-99m Myoview.  At peak pharmacologic effect, 10-20 seconds after Lexiscan,  the  patient was injected intravenously with 32 mCi of  Tc-99m Myoview. The  post-stress tomographic imaging was performed approximately 60 minutes  after stress.    EKG Findings  The resting EKG demonstrated normal sinus rhythm. The stress EKG  demonstrated no ischemic changes.    Tomographic Findings  Overall, the study quality is adequate . On the stress images, mild to  moderate count reduction is seen in the basal to mid anterior  anteroseptal wall and mild to moderate count reduction is seen in  the  basal inferior inferior septal and inferolateral walls. On the rest  images, there is reversibility seen in the anteroseptal wall with  similar fixed pattern of decreased counts in the basal inferior  inferior septal and inferolateral walls. Results suggest a  nontransmural scar of the basal to mid anteroseptal wall with mild  chhaya-infarct ischemia and mostly fixed basal inferior inferior septal  and inferolateral defect . Gated images demonstrated normal LV cavity  size with end-diastolic volumes measuring 102 and 10 7 mL and rest and  stress respectively. LV systolic function appears normal. The left  ventricular ejection fraction was calculated to be 79%. TID was  absent.    DORY MOSS MD     EKG results: NSR with non-specific ST-T wave abnormalities.

## 2019-02-20 NOTE — PROGRESS NOTES
Walking Lexiscan.  Pt asymptomatic and tolerated very well.  See flow sheet.  Back to room with EKG staff.

## 2019-02-21 ENCOUNTER — SURGERY (OUTPATIENT)
Age: 54
End: 2019-02-21
Payer: COMMERCIAL

## 2019-02-21 LAB — POTASSIUM SERPL-SCNC: 3.5 MMOL/L (ref 3.4–5.3)

## 2019-02-21 PROCEDURE — 99152 MOD SED SAME PHYS/QHP 5/>YRS: CPT | Performed by: INTERNAL MEDICINE

## 2019-02-21 PROCEDURE — 99214 OFFICE O/P EST MOD 30 MIN: CPT | Mod: 25 | Performed by: INTERNAL MEDICINE

## 2019-02-21 PROCEDURE — 25800030 ZZH RX IP 258 OP 636: Performed by: INTERNAL MEDICINE

## 2019-02-21 PROCEDURE — 99152 MOD SED SAME PHYS/QHP 5/>YRS: CPT

## 2019-02-21 PROCEDURE — 93458 L HRT ARTERY/VENTRICLE ANGIO: CPT | Performed by: INTERNAL MEDICINE

## 2019-02-21 PROCEDURE — 27210794 ZZH OR GENERAL SUPPLY STERILE: Performed by: INTERNAL MEDICINE

## 2019-02-21 PROCEDURE — 99225 ZZC SUBSEQUENT OBSERVATION CARE,LEVEL II: CPT | Performed by: INTERNAL MEDICINE

## 2019-02-21 PROCEDURE — 25000125 ZZHC RX 250: Performed by: INTERNAL MEDICINE

## 2019-02-21 PROCEDURE — 25000132 ZZH RX MED GY IP 250 OP 250 PS 637: Performed by: INTERNAL MEDICINE

## 2019-02-21 PROCEDURE — 83835 ASSAY OF METANEPHRINES: CPT | Performed by: INTERNAL MEDICINE

## 2019-02-21 PROCEDURE — 36415 COLL VENOUS BLD VENIPUNCTURE: CPT | Performed by: INTERNAL MEDICINE

## 2019-02-21 PROCEDURE — 25000128 H RX IP 250 OP 636: Performed by: INTERNAL MEDICINE

## 2019-02-21 PROCEDURE — 93458 L HRT ARTERY/VENTRICLE ANGIO: CPT | Mod: 26 | Performed by: INTERNAL MEDICINE

## 2019-02-21 PROCEDURE — G0378 HOSPITAL OBSERVATION PER HR: HCPCS

## 2019-02-21 PROCEDURE — 84132 ASSAY OF SERUM POTASSIUM: CPT | Performed by: INTERNAL MEDICINE

## 2019-02-21 PROCEDURE — 93005 ELECTROCARDIOGRAM TRACING: CPT

## 2019-02-21 PROCEDURE — 40000065 ZZH STATISTIC EKG NON-CHARGEABLE

## 2019-02-21 RX ORDER — NALOXONE HYDROCHLORIDE 0.4 MG/ML
.1-.4 INJECTION, SOLUTION INTRAMUSCULAR; INTRAVENOUS; SUBCUTANEOUS
Status: DISCONTINUED | OUTPATIENT
Start: 2019-02-21 | End: 2019-02-22 | Stop reason: HOSPADM

## 2019-02-21 RX ORDER — ATROPINE SULFATE 0.1 MG/ML
0.5 INJECTION INTRAVENOUS EVERY 5 MIN PRN
Status: ACTIVE | OUTPATIENT
Start: 2019-02-21 | End: 2019-02-22

## 2019-02-21 RX ORDER — LIDOCAINE 40 MG/G
CREAM TOPICAL
Status: DISCONTINUED | OUTPATIENT
Start: 2019-02-21 | End: 2019-02-22 | Stop reason: HOSPADM

## 2019-02-21 RX ORDER — LORAZEPAM 0.5 MG/1
0.5 TABLET ORAL
Status: DISCONTINUED | OUTPATIENT
Start: 2019-02-21 | End: 2019-02-21 | Stop reason: HOSPADM

## 2019-02-21 RX ORDER — ASPIRIN 81 MG/1
81 TABLET ORAL DAILY
Status: DISCONTINUED | OUTPATIENT
Start: 2019-02-22 | End: 2019-02-22 | Stop reason: HOSPADM

## 2019-02-21 RX ORDER — SODIUM CHLORIDE 9 MG/ML
INJECTION, SOLUTION INTRAVENOUS CONTINUOUS
Status: DISCONTINUED | OUTPATIENT
Start: 2019-02-21 | End: 2019-02-21 | Stop reason: HOSPADM

## 2019-02-21 RX ORDER — SODIUM CHLORIDE 9 MG/ML
INJECTION, SOLUTION INTRAVENOUS CONTINUOUS
Status: DISCONTINUED | OUTPATIENT
Start: 2019-02-21 | End: 2019-02-22

## 2019-02-21 RX ORDER — NALOXONE HYDROCHLORIDE 0.4 MG/ML
.2-.4 INJECTION, SOLUTION INTRAMUSCULAR; INTRAVENOUS; SUBCUTANEOUS
Status: ACTIVE | OUTPATIENT
Start: 2019-02-21 | End: 2019-02-22

## 2019-02-21 RX ORDER — FENTANYL CITRATE 50 UG/ML
INJECTION, SOLUTION INTRAMUSCULAR; INTRAVENOUS
Status: DISCONTINUED | OUTPATIENT
Start: 2019-02-21 | End: 2019-02-21 | Stop reason: HOSPADM

## 2019-02-21 RX ORDER — LIDOCAINE 40 MG/G
CREAM TOPICAL
Status: DISCONTINUED | OUTPATIENT
Start: 2019-02-21 | End: 2019-02-21 | Stop reason: HOSPADM

## 2019-02-21 RX ORDER — ACETAMINOPHEN 325 MG/1
325-650 TABLET ORAL EVERY 4 HOURS PRN
Status: DISCONTINUED | OUTPATIENT
Start: 2019-02-21 | End: 2019-02-22 | Stop reason: HOSPADM

## 2019-02-21 RX ORDER — POTASSIUM CHLORIDE 1500 MG/1
20 TABLET, EXTENDED RELEASE ORAL
Status: COMPLETED | OUTPATIENT
Start: 2019-02-21 | End: 2019-02-21

## 2019-02-21 RX ORDER — METOPROLOL TARTRATE 1 MG/ML
INJECTION, SOLUTION INTRAVENOUS
Status: DISCONTINUED | OUTPATIENT
Start: 2019-02-21 | End: 2019-02-21 | Stop reason: HOSPADM

## 2019-02-21 RX ORDER — LORAZEPAM 2 MG/ML
0.5 INJECTION INTRAMUSCULAR
Status: DISCONTINUED | OUTPATIENT
Start: 2019-02-21 | End: 2019-02-21 | Stop reason: HOSPADM

## 2019-02-21 RX ORDER — HYDROCODONE BITARTRATE AND ACETAMINOPHEN 5; 325 MG/1; MG/1
1-2 TABLET ORAL EVERY 4 HOURS PRN
Status: DISCONTINUED | OUTPATIENT
Start: 2019-02-21 | End: 2019-02-22 | Stop reason: HOSPADM

## 2019-02-21 RX ORDER — FENTANYL CITRATE 50 UG/ML
25-50 INJECTION, SOLUTION INTRAMUSCULAR; INTRAVENOUS
Status: ACTIVE | OUTPATIENT
Start: 2019-02-21 | End: 2019-02-22

## 2019-02-21 RX ORDER — HYDRALAZINE HYDROCHLORIDE 20 MG/ML
10 INJECTION INTRAMUSCULAR; INTRAVENOUS ONCE
Status: COMPLETED | OUTPATIENT
Start: 2019-02-21 | End: 2019-02-21

## 2019-02-21 RX ORDER — HYDRALAZINE HYDROCHLORIDE 20 MG/ML
INJECTION INTRAMUSCULAR; INTRAVENOUS
Status: DISCONTINUED | OUTPATIENT
Start: 2019-02-21 | End: 2019-02-21 | Stop reason: HOSPADM

## 2019-02-21 RX ORDER — FLUMAZENIL 0.1 MG/ML
0.2 INJECTION, SOLUTION INTRAVENOUS
Status: ACTIVE | OUTPATIENT
Start: 2019-02-21 | End: 2019-02-22

## 2019-02-21 RX ADMIN — FENTANYL CITRATE 50 MCG: 50 INJECTION, SOLUTION INTRAMUSCULAR; INTRAVENOUS at 16:27

## 2019-02-21 RX ADMIN — METOPROLOL TARTRATE 5 MG: 1 INJECTION, SOLUTION INTRAVENOUS at 16:46

## 2019-02-21 RX ADMIN — ONDANSETRON 4 MG: 2 INJECTION INTRAMUSCULAR; INTRAVENOUS at 19:14

## 2019-02-21 RX ADMIN — HYDRALAZINE HYDROCHLORIDE 10 MG: 20 INJECTION INTRAMUSCULAR; INTRAVENOUS at 16:54

## 2019-02-21 RX ADMIN — FENTANYL CITRATE 50 MCG: 50 INJECTION, SOLUTION INTRAMUSCULAR; INTRAVENOUS at 16:30

## 2019-02-21 RX ADMIN — MIDAZOLAM 1 MG: 1 INJECTION INTRAMUSCULAR; INTRAVENOUS at 16:46

## 2019-02-21 RX ADMIN — CLOPIDOGREL BISULFATE 75 MG: 75 TABLET ORAL at 07:48

## 2019-02-21 RX ADMIN — MIDAZOLAM 1 MG: 1 INJECTION INTRAMUSCULAR; INTRAVENOUS at 16:30

## 2019-02-21 RX ADMIN — LOSARTAN POTASSIUM 100 MG: 100 TABLET, FILM COATED ORAL at 07:48

## 2019-02-21 RX ADMIN — SODIUM CHLORIDE: 9 INJECTION, SOLUTION INTRAVENOUS at 07:01

## 2019-02-21 RX ADMIN — HYDRALAZINE HYDROCHLORIDE 25 MG: 25 TABLET ORAL at 22:00

## 2019-02-21 RX ADMIN — CARVEDILOL 12.5 MG: 12.5 TABLET, FILM COATED ORAL at 19:44

## 2019-02-21 RX ADMIN — HYDRALAZINE HYDROCHLORIDE 25 MG: 25 TABLET ORAL at 13:05

## 2019-02-21 RX ADMIN — HYDRALAZINE HYDROCHLORIDE 25 MG: 25 TABLET ORAL at 06:27

## 2019-02-21 RX ADMIN — LIDOCAINE HYDROCHLORIDE 10 ML: 10 INJECTION, SOLUTION INFILTRATION; PERINEURAL at 16:29

## 2019-02-21 RX ADMIN — MIDAZOLAM 1 MG: 1 INJECTION INTRAMUSCULAR; INTRAVENOUS at 16:25

## 2019-02-21 RX ADMIN — CARVEDILOL 12.5 MG: 12.5 TABLET, FILM COATED ORAL at 07:48

## 2019-02-21 RX ADMIN — HYDRALAZINE HYDROCHLORIDE 10 MG: 20 INJECTION INTRAMUSCULAR; INTRAVENOUS at 17:31

## 2019-02-21 RX ADMIN — ASPIRIN 325 MG: 325 TABLET, DELAYED RELEASE ORAL at 07:48

## 2019-02-21 RX ADMIN — POTASSIUM CHLORIDE 20 MEQ: 1500 TABLET, EXTENDED RELEASE ORAL at 06:28

## 2019-02-21 RX ADMIN — ATORVASTATIN CALCIUM 40 MG: 40 TABLET, FILM COATED ORAL at 22:00

## 2019-02-21 NOTE — CONSULTS
"Cardiology Progress Note          Assessment and Plan:         53-year-old gentleman with a history of coronary artery disease status post YAMIL to the LAD , poorly controlled HTN and dyslipidemia who presented with:         1. Chest discomfort post exertion associated with diaphoresis  2. Abnormal nuclear stress test consistent with mild anteroseptal ischemia  3. Moderate to severe hypertension  4. Acute kidney injury, resolved    PLAN  - Coronary angio today  - continue current meds        Interval History:     No CP or SOB.            Review of Systems:   As per subjective, otherwise 5 systems reviewed and negative.           Physical Exam:   Blood pressure (!) 164/99, pulse 61, temperature 98.4  F (36.9  C), temperature source Oral, resp. rate 16, height 1.727 m (5' 8\"), weight 73.3 kg (161 lb 9.6 oz), SpO2 98 %.      Vital Sign Ranges  Temperature Temp  Av  F (36.7  C)  Min: 97.2  F (36.2  C)  Max: 98.8  F (37.1  C)   Blood pressure Systolic (24hrs), Av , Min:136 , Max:170        Diastolic (24hrs), Av, Min:87, Max:99      Pulse Pulse  Av  Min: 61  Max: 61   Respirations Resp  Av  Min: 16  Max: 16   Pulse oximetry SpO2  Av.5 %  Min: 97 %  Max: 98 %         Intake/Output Summary (Last 24 hours) at 2019 1111  Last data filed at 2019 1600  Gross per 24 hour   Intake 1021 ml   Output --   Net 1021 ml       Constitutional: NAD  Skin: Warm and dry  Neck: No JVD  Lungs: CTA  Cardiovascular: RRR, no m/r/g  Abdomen: Soft, non tender.  Extremities and Back: No LE edema  Neurological: Nonfocal           Medications:     I have reviewed this patient's current medications.              Data:     Labs reviewed.     Tele: RAQUEL Peterson M.D.    "

## 2019-02-21 NOTE — PROGRESS NOTES
[facility]    Hospitalist Progress Note    Assessment & Plan   Mr. Pranva Loo is a delightful 53-year-old  gentleman with a past medical history notable for coronary artery disease, hypertension, dyslipidemia, degenerative disk disease of the lumbar spine, osteoarthritis of the hip, and chronic kidney disease stage III, who has presented to the Emergency Department with the complaint of chest pain after shoveling snow.      Chest Pain   -intermittent precordial chest pressure with exertion, 5-6 / 10 in severity, associated with diaphoresis and feeling cold, but no radiation to the back, jaw, or left arm.  HIs symptoms of excessive sweating and feeling cold are likely due to hypothermia, in the setting working outside shoveling snow. Known CAD, s/p YAMIL to the LAD in 01/2016. The electrocardiogram reveals a normal sinus rhythm, LVH, nonspecific ST abnormality, and RV conduction delay. Troponins negative, d-dimer negative. CXR clear.    - Mildly abnormal NM stress test: shows partially fixed basal to mid anteroseptal defect c/w nontransmural scar with mild chhaya-infarct ischemia.   - Appreciate cardiology consult. coronary angiogram today     HTN   - BP uncontrolled in the 170's to 180s on 02/20/19 > stop IVF  - continues on PTA losartan 100 mg daily   - changed metoprolol 25 mg BID to coreg 12.5 mg BID on 02/20/19.   - holding triamterene/HCTZ in the context of mild SEBASTIAN  - start hydralazine 25 mg TID prior to cath with plan to resume diuretic post cath.      Acute kidney injury:  The patient has a known history of chronic kidney disease stage III with a baseline creatinine in the range of 1.1-1.2. Cr at admission 1.47 / BUN of 28, secondary to prerenal azotemia due to volume depletion after excessive exertion.   - Cr back to baseline with IVF.      Hypothermia:  Upon arrival, the patient was hypothermic with a temperature of 92.9 degrees Fahrenheit due to shoveling snow outside for several  hours.  His temperature has improved to 97.4 after treatment with a Faby Hugger and warm normal saline.   -Temperature normal on 02/20/19.      Pre-diabetes: A1c 6.1  , mildly low today, asymptomatic. Not on insulin or diabetic medication.   -Follow BS TID    Dyslipidemia:    -continue PTA statin. LDL 62, HDL 46.             # Pain Assessment:  Current Pain Score 2/21/2019   Patient currently in pain? yes   Pain score (0-10) 5   Pain location -   Pain descriptors -   Pranav s pain level was assessed and he currently denies pain.        D/W: RN  DVT Prophylaxis: Pneumatic Compression Devices  Code Status: Full Code    Disposition: Expected discharge later today or tomorrow pending COA today    Manny Zuniga MD    Interval History   No further CP or dyspnea. No n/v or abd pain. Denies cough. CoA today    -Data reviewed today: I reviewed all new labs and imaging results over the last 24 hours. I personally reviewed no images or EKG's today.      Physical Exam   Temp: 98.1  F (36.7  C) Temp src: Oral BP: (!) 178/104   Heart Rate: 58 Resp: 16 SpO2: 98 % O2 Device: None (Room air)    Vitals:    02/19/19 1902   Weight: 73.3 kg (161 lb 9.6 oz)     Vital Signs with Ranges  Temp:  [97.2  F (36.2  C)-98.4  F (36.9  C)] 98.1  F (36.7  C)  Heart Rate:  [54-66] 58  Resp:  [16] 16  BP: (136-178)/() 178/104  SpO2:  [97 %-98 %] 98 %  I/O last 3 completed shifts:  In: 1221 [P.O.:200; I.V.:1021]  Out: -     Constitutional: AAOX3, NAD, Appears comfortable  Respiratory:  CTA B/L, Normal WOB  Cardiovascular: RRR, No murmur  GI: Soft, Non- tender, BS- normoactive, No Guarding/rebound/rigidity  Skin/Integument: Warm and dry, no rashes  MSK: No joint deformity or swelling, no edema  Neuro: CN- grossly intact    Medications       - MEDICATION INSTRUCTIONS -       sodium chloride 150 mL/hr at 02/21/19 0701         aspirin  325 mg Oral Daily     atorvastatin  40 mg Oral At Bedtime     carvedilol  12.5 mg Oral BID w/meals      clopidogrel  75 mg Oral Daily     hydrALAZINE  25 mg Oral Q8H SAPPHIRE     losartan  100 mg Oral Daily     sodium chloride (PF)  3 mL Intracatheter Q8H       Data     Recent Labs   Lab 02/21/19  0450 02/20/19  0930 02/20/19  0606 02/20/19  0305 02/19/19  2335 02/19/19  1900   WBC  --   --  10.3  --   --  21.0*   HGB  --   --  15.0  --   --  16.5   MCV  --   --  90  --   --  90   PLT  --   --  218  --   --  297   NA  --   --  141  --   --  138   POTASSIUM 3.5  --  3.8  --  4.2 2.9*   CHLORIDE  --   --  113*  --   --  104   CO2  --   --  19*  --   --  17*   BUN  --   --  22  --   --  28   CR  --   --  1.18  --   --  1.47*   ANIONGAP  --   --  9  --   --  17*   SHWETA  --   --  8.3*  --   --  10.0   GLC  --   --  68*  --   --  111*   ALBUMIN  --   --   --   --   --  4.5   PROTTOTAL  --   --   --   --   --  8.3   BILITOTAL  --   --   --   --   --  0.6   ALKPHOS  --   --   --   --   --  100   ALT  --   --   --   --   --  39   AST  --   --   --   --   --  21   TROPI  --  0.021  --  0.036 0.026 <0.015       No results found for this or any previous visit (from the past 24 hour(s)).

## 2019-02-21 NOTE — PROCEDURES
Heart cath  No cad, stent open  Lvg ok  NOTE-PT SWEAT PROFUSELY AND BP SPIKE-CONSIDER PHEOCHROMOCYTOMA W/U  (versus reaction to dye or fentanyl)

## 2019-02-21 NOTE — DISCHARGE INSTRUCTIONS
Cardiac Angiogram Discharge Instructions - Femoral    After you go home:      Have an adult stay with you until tomorrow.    Drink extra fluids for 2 days.    You may resume your normal diet.    No smoking       For 24 hours - due to the sedation you received:    Relax and take it easy.    Do NOT make any important or legal decisions.    Do NOT drive or operate machines at home or at work.    Do NOT drink alcohol.    Care of Groin Puncture Site:      For the first 24 hrs - check the puncture site every 1-2 hours while awake.    For 2 days, when you cough, sneeze, laugh or move your bowels, hold your hand over the puncture site and press firmly.    Remove the bandaid after 24 hours. If there is minor oozing, apply another bandaid and remove it after 12 hours.    It is normal to have a small bruise or pea size lump at the site.    You may shower tomorrow. Do NOT take a bath, or use a hot tub or pool for at least 3 days. Do NOT scrub the site. Do not use lotion or powder near the puncture site.    Activity:            For 2 days:    No stooping or squatting    Do NOT do any heavy activity such as exercise, lifting, or straining.     No housework, yard work or any activity that make you sweat    Do NOT lift more than 10 pounds    Bleeding:      If you start bleeding from the site in your groin, lie down flat and press firmly on/above the site for 10 minutes.     Once bleeding stops, lay flat for 2 hours.     Call [facility] as soon as you can.       Call 911 right away if you have heavy bleeding or bleeding that does not stop.      Medicines:      You are taking an antiplatelet medication - Plavix - do not stop taking it until you talk to your cardiologist.      Take your medications, including blood thinners, unless your provider tells you not to.     If you have stopped any medicines, check with your provider about when to restart them.    Follow Up Appointments:      Follow up with [facility] Nurse Practitioner at [facility] Clinic of patient preference in 7-10 days.    Call the clinic if:      You have increased pain or a large or growing hard lump around the site.    The site is red, swollen, hot or tender.    Blood or fluid is draining from the site.    You have chills or a fever greater than 101 F (38 C).    Your leg feels numb, cool or changes color.    You have hives, a rash or unusual itching.    New pain in the back or belly that you cannot control with Tylenol.    Any questions or concerns.          [facility] [place]:    [phone] UM (7 days a week)

## 2019-02-21 NOTE — PLAN OF CARE
A/O. BP slightly elevated - started on PO Hydralazine 3 times/day by Cardiology. Tele SB/SR. Plan for Angio tomorrow. Denies chest pain. Denies shortness of breath. Up independently with cane. IV fluids discontinued.

## 2019-02-21 NOTE — PROGRESS NOTES
Observation Goals:    - Serial troponins and stress test complete. - not met  - Seen and cleared by consultant if applicable - not met  - Adequate pain control on oral analgesia - met  - Vital signs normal or at patient baseline - met  - Safe disposition plan has been identified - not met

## 2019-02-21 NOTE — PROGRESS NOTES
"  1705 Pt returned from Cath Lab. Gauze drsg CDI to right groin puncture site. No oozing or hematoma noted. Area soft & flat. Pt denies pain. Pt instructed on activity restrictions while on bedrest. Verbal understanding received from pt. BP remains elevated.  1710 Pt's wife at bedside. Detailed update given.   1713 Dr Moy at bedside to speak with pt & wife.   1715 Pt c/o nausea. Small emesis at this time after small sip of Sprite. Small hematoma noted after emesis. Manual pressure held x 5 min until area softened. 50 cent sized bloody area noted on drsg.  1733 Hydralazine 10mg IV given at this time.  1815 Pt unable to void. Pt straight cathed - relief obtained.  1905 OOB - steady on feet. Ambulated in halls to bathroom with fair zaynab. Pt c/o nausea & shakiness. Pt stated \"this nausea happened to me last time I had a heart cath also\". No change in puncture site assessment with activity.  1907 Detailed report called to KALYAN Gray in OBS.  1915 Medicated with Zofran.  1920 Pt transferred to OBS rm 15. All personal belongings sent w/ pt.      "

## 2019-02-21 NOTE — PLAN OF CARE
AVSS; tele SR/SB; pt denied pain; up with SBA; voiding; NPO after midnight for angio today; KCl 20 meq po given x 1 for K+ of 3.5 per angio protocol; IV NS started at 0700 as per angio order; continue to monitor.

## 2019-02-21 NOTE — PLAN OF CARE
Alert and oriented x4. Vital signs stable, except HTN on room air - scheduled meds given. Standby assist with cane. NPO. Lung sounds clear. Bowel sounds active, + flatus. Adequate urine output. Chronic back pain, declined interventions. Denies nausea. Tele sinus mandie.

## 2019-02-21 NOTE — PLAN OF CARE
PRIMARY DIAGNOSIS: CHEST PAIN  OUTPATIENT/OBSERVATION GOALS TO BE MET BEFORE DISCHARGE:  1. Ruled out acute coronary syndrome (negative or stable Troponin):  Yes  2. Pain Status: Pain free.  3. Appropriate provocative testing performed: No  - Stress Test Procedure: Nuclear med, will have angio tomorrow  - Interpretation of cardiac rhythm per telemetry tech: sinus mandie    4. Cleared by Consultants (if applicable):No  5. Return to near baseline physical activity: Yes  Discharge Planner Nurse   Safe discharge environment identified: Yes  Barriers to discharge: No       Entered by: Cynthia Adams 02/20/2019 7:43 PM     Please review provider order for any additional goals.   Nurse to notify provider when observation goals have been met and patient is ready for discharge.

## 2019-02-21 NOTE — PLAN OF CARE
PRIMARY DIAGNOSIS: CHEST PAIN  OUTPATIENT/OBSERVATION GOALS TO BE MET BEFORE DISCHARGE:  1. Ruled out acute coronary syndrome (negative or stable Troponin):  Yes  2. Pain Status: Pain free.  3. Appropriate provocative testing performed: No  - Stress Test Procedure: Lesiscan, Angio today  - Interpretation of cardiac rhythm per telemetry tech: SR/SB    4. Cleared by Consultants (if applicable):No  5. Return to near baseline physical activity: Yes  Discharge Planner Nurse   Safe discharge environment identified: Yes  Barriers to discharge: Yes       Entered by: Jose Alberto Fletcher 02/21/2019 11:19 AM     Please review provider order for any additional goals.   Nurse to notify provider when observation goals have been met and patient is ready for discharge.  A&Ox4, AVSS except elevated BP, SBA, IV NS 75 ml/hr, denies pain, dizziness, SOB. Angio today, consent complete. Continue to monitor.

## 2019-02-22 VITALS
RESPIRATION RATE: 16 BRPM | SYSTOLIC BLOOD PRESSURE: 147 MMHG | HEIGHT: 68 IN | DIASTOLIC BLOOD PRESSURE: 88 MMHG | BODY MASS INDEX: 24.49 KG/M2 | TEMPERATURE: 97.5 F | OXYGEN SATURATION: 97 % | WEIGHT: 161.6 LBS | HEART RATE: 70 BPM

## 2019-02-22 PROCEDURE — 25000132 ZZH RX MED GY IP 250 OP 250 PS 637: Performed by: INTERNAL MEDICINE

## 2019-02-22 PROCEDURE — 99217 ZZC OBSERVATION CARE DISCHARGE: CPT | Performed by: INTERNAL MEDICINE

## 2019-02-22 PROCEDURE — G0378 HOSPITAL OBSERVATION PER HR: HCPCS

## 2019-02-22 RX ORDER — CARVEDILOL 12.5 MG/1
12.5 TABLET ORAL 2 TIMES DAILY WITH MEALS
Qty: 60 TABLET | Refills: 1 | Status: SHIPPED | OUTPATIENT
Start: 2019-02-22 | End: 2019-03-24

## 2019-02-22 RX ORDER — TRIAMTERENE/HYDROCHLOROTHIAZID 37.5-25 MG
1 TABLET ORAL EVERY MORNING
Qty: 30 TABLET | Refills: 0
Start: 2019-02-23 | End: 2019-02-26 | Stop reason: ALTCHOICE

## 2019-02-22 RX ORDER — TRIAMTERENE/HYDROCHLOROTHIAZID 37.5-25 MG
1 TABLET ORAL EVERY MORNING
Status: DISCONTINUED | OUTPATIENT
Start: 2019-02-22 | End: 2019-02-22 | Stop reason: HOSPADM

## 2019-02-22 RX ADMIN — ASPIRIN 81 MG: 81 TABLET, COATED ORAL at 09:16

## 2019-02-22 RX ADMIN — TRIAMTERENE AND HYDROCHLOROTHIAZIDE 1 TABLET: 37.5; 25 TABLET ORAL at 09:16

## 2019-02-22 RX ADMIN — LOSARTAN POTASSIUM 100 MG: 100 TABLET, FILM COATED ORAL at 09:16

## 2019-02-22 RX ADMIN — HYDRALAZINE HYDROCHLORIDE 25 MG: 25 TABLET ORAL at 06:43

## 2019-02-22 RX ADMIN — CLOPIDOGREL BISULFATE 75 MG: 75 TABLET ORAL at 09:16

## 2019-02-22 RX ADMIN — CARVEDILOL 12.5 MG: 12.5 TABLET, FILM COATED ORAL at 09:16

## 2019-02-22 NOTE — DISCHARGE SUMMARY
[facility]    Discharge Summary  Hospitalist    Date of Admission:  2/19/2019  Date of Discharge:  2/22/2019  Discharging Provider: Manny Zuniga MD    Discharge Diagnoses      Chest Pain   HTN   Acute kidney injury   Hypothermia   Pre-diabetes  Dyslipidemia    Hospital Course   Mr. Pranav Loo is a delightful 53-year-old  gentleman with a past medical history notable for coronary artery disease, hypertension, dyslipidemia, degenerative disk disease of the lumbar spine, osteoarthritis of the hip, and chronic kidney disease stage III, who has presented to the Emergency Department with the complaint of chest pain after shoveling snow.      Chest Pain   -intermittent precordial chest pressure with exertion, 5-6 / 10 in severity, associated with diaphoresis and feeling cold, but no radiation to the back, jaw, or left arm.  HIs symptoms of excessive sweating and feeling cold are likely due to hypothermia, in the setting working outside shoveling snow. Known CAD, s/p YAMIL to the LAD in 01/2016. The electrocardiogram reveals a normal sinus rhythm, LVH, nonspecific ST abnormality, and RV conduction delay. Troponins negative, d-dimer negative. CXR clear.    - Mildly abnormal NM stress test: shows partially fixed basal to mid anteroseptal defect c/w nontransmural scar with mild chhaya-infarct ischemia.   -Cardiology consulted, patient underwent coronary angiogram on 2/21 which did not show any occlusive disease.  Discussed with cardiology, Dr. Peterson on the day of discharge, no further recommendations at this time other than follow-up outpatient with primary cardiologist.     HTN   - BP uncontrolled in the 170's to 180s on 02/20/19 > stop IVF  - continues on PTA losartan 100 mg daily   - changed metoprolol 25 mg BID to coreg 12.5 mg BID on 02/20/19.   - Resumed triamterene/HCTZ on the day of discharge  - Patient had previously sweating episode and BP spike cardiac cath, cardiology was concerned  about show pheochromocytoma, plasma metanephrines sent, awaiting results, discussed with the patient this will need to be followed up with his PCP.  -Discussed with Dr. Peterson, cardiology on the day of discharge as blood pressure was reasonably controlled on current regimen we will not make any further changes except for Coreg in place of metoprolol as mentioned above.  Defer further medication adjustment to PCP and primary cardiologist      Acute kidney injury:  The patient has a known history of chronic kidney disease stage III with a baseline creatinine in the range of 1.1-1.2. Cr at admission 1.47 / BUN of 28, secondary to prerenal azotemia due to volume depletion after excessive exertion.   - Cr back to baseline with IVF.      Hypothermia:  Upon arrival, the patient was hypothermic with a temperature of 92.9 degrees Fahrenheit due to shoveling snow outside for several hours.  His temperature has improved to 97.4 after treatment with a Faby Hugger and warm normal saline.   -Temperature normal on 02/20/19.      Pre-diabetes: A1c 6.1  , mildly low today, asymptomatic. Not on insulin or diabetic medication.   -Follow BS TID     Dyslipidemia:    -continue PTA statin. LDL 62, HDL 46.       # Discharge Pain Plan:   - Patient currently has NO PAIN and is not being prescribed pain medications on discharge.      Manny Zuniga MD    Significant Results and Procedures   See below    Pending Results     Unresulted Labs Ordered in the Past 30 Days of this Admission     Date and Time Order Name Status Description    2/21/2019 1642 Metanephrines Plasma Free In process           Code Status   Full Code       Primary Care Physician   [facility]    Physical Exam   Temp: 97.5  F (36.4  C) Temp src: Oral BP: 147/88 Pulse: 70 Heart Rate: 61 Resp: 16 SpO2: 97 % O2 Device: None (Room air)      Constitutional: AAOX3, NAD  Respiratory: CTA B/L, Normal WOB  Cardiovascular: RRR, No murmur  GI: Soft, Non- tender, BS-  normoactive  Neuro: CN- grossly intact    Discharge Disposition   Discharged to home  Condition at discharge: Stable    Consultations This Hospital Stay   SOCIAL WORK IP CONSULT  CARDIOLOGY IP CONSULT  PHARMACY IP CONSULT  PHARMACY IP CONSULT  SMOKING CESSATION PROGRAM IP CONSULT    Time Spent on this Encounter   Manny PHILLIPS, personally saw the patient today and spent less than or equal to 30 minutes discharging this patient.    Discharge Orders      Follow-up and recommended labs and tests    Follow up with primary care provider, [facility], within 7 days for hospital follow- up.  Needs f/u of plasma metanephrines drawn prior to discharge.  BMP in 1 week.     Activity    Your activity upon discharge: activity as tolerated     Full Code     Diet    Follow this diet upon discharge: Orders Placed This Encounter      Advance Diet as Tolerated: Regular Diet Adult       Discharge Medications   Current Discharge Medication List      START taking these medications    Details   carvedilol (COREG) 12.5 MG tablet Take 1 tablet (12.5 mg) by mouth 2 times daily (with meals)  Qty: 60 tablet, Refills: 1    Associated Diagnoses: Benign essential hypertension      triamterene-HCTZ (MAXZIDE-25) 37.5-25 MG tablet Take 1 tablet by mouth every morning  Qty: 30 tablet, Refills: 0    Associated Diagnoses: Benign essential hypertension         CONTINUE these medications which have NOT CHANGED    Details   aspirin (ASA) 81 MG chewable tablet Take 81 mg by mouth daily      atorvastatin (LIPITOR) 40 MG tablet Take 40 mg by mouth At Bedtime      clindamycin (CLEOCIN) 300 MG capsule Take 600 mg by mouth daily as needed (dental procedure.) 2 x 300 mg      clopidogrel (PLAVIX) 75 MG tablet Take 75 mg by mouth daily      diclofenac (VOLTAREN) 1 % topical gel Place onto the skin daily as needed (pain in hands)       losartan (COZAAR) 100 MG tablet Take 100 mg by mouth daily      nitroGLYcerin (NITROSTAT) 0.4 MG sublingual  tablet Place 0.4 mg under the tongue every 5 minutes as needed for chest pain For chest pain place 1 tablet under the tongue every 5 minutes for 3 doses. If symptoms persist 5 minutes after 1st dose call 911.      potassium chloride (KLOR-CON) 20 MEQ packet Take 20 mEq by mouth daily He is currently on the packet      Vitamin D, Cholecalciferol, 1000 units CAPS Take 1,000 Units by mouth daily         STOP taking these medications       metoprolol tartrate (LOPRESSOR) 25 MG tablet Comments:   Reason for Stopping:         triamterene-HCTZ (DYAZIDE) 37.5-25 MG capsule Comments:   Reason for Stopping:             Allergies   Allergies   Allergen Reactions     Fentanyl Nausea and Vomiting     Unknown if Fentanyl or Versed - pt had N&V after both heart caths after receiving sedation     Penicillin G      Unknown reaction during childhood     Data   Most Recent 3 CBC's:  Recent Labs   Lab Test 02/20/19  0606 02/19/19  1900 08/21/15  2300   WBC 10.3 21.0* 9.5   HGB 15.0 16.5 17.7   MCV 90 90 88    297 255      Most Recent 3 BMP's:  Recent Labs   Lab Test 02/21/19  0450 02/20/19  0606 02/19/19  2335 02/19/19  1900 08/22/15  0518   NA  --  141  --  138 139   POTASSIUM 3.5 3.8 4.2 2.9* 3.3*   CHLORIDE  --  113*  --  104 105   CO2  --  19*  --  17* 24   BUN  --  22  --  28 25   CR  --  1.18  --  1.47* 1.12   ANIONGAP  --  9  --  17* 10   SHWETA  --  8.3*  --  10.0 8.6   GLC  --  68*  --  111* 91     Most Recent 2 LFT's:  Recent Labs   Lab Test 02/19/19  1900 11/06/12  0541   AST 21 58*   ALT 39 38   ALKPHOS 100 61   BILITOTAL 0.6 0.6     Most Recent INR's and Anticoagulation Dosing History:  Anticoagulation Dose History     Recent Dosing and Labs Latest Ref Rng & Units 11/6/2012 11/7/2012 11/8/2012 11/12/2012 11/15/2012 11/19/2012 11/26/2012    Warfarin 5 mg - 5 mg 5 mg - - - - -    INR - 1.02 1.05 1.34(H) - 1.5 1.3 1.8    INR 0.86 - 1.14 - - - 2.9(A) - - -        Most Recent 3 Troponin's:  Recent Labs   Lab Test  02/20/19  0930 02/20/19  0305 02/19/19  2335  08/21/15  2305   TROPI 0.021 0.036 0.026   < >  --    TROPONIN  --   --   --   --  0.00    < > = values in this interval not displayed.     Most Recent Cholesterol Panel:  Recent Labs   Lab Test 02/20/19  0606   CHOL 129   LDL 62   HDL 46   TRIG 103     Most Recent 6 Bacteria Isolates From Any Culture (See EPIC Reports for Culture Details):No lab results found.  Most Recent TSH, T4 and A1c Labs:  Recent Labs   Lab Test 02/19/19  2335   A1C 6.1*       Results for orders placed or performed during the hospital encounter of 02/19/19   Chest  XR, 1 view PORTABLE    Narrative    CHEST PORTABLE ONE VIEW 2/19/2019 7:45 PM     HISTORY: Chest pain and shortness of breath.    COMPARISON: 8/22/2015      Impression    IMPRESSION:  No acute consolidation.    VIJAY FLORES MD   NM Lexiscan stress test (nuc card)    Narrative    GATED MYOCARDIAL PERFUSION SCINTIGRAPHY WITH INTRAVENOUS PHARMACOLOGIC  VASODILATATION LEXISCAN -ONE DAY STUDY     2/20/2019 10:24 AM  PHOEBE GAMEZ  53 years  Male  1965.    Indication/Clinical History: History of CAD    Impression  1.  Myocardial perfusion imaging using single isotope technique  demonstrated partially fixed basal to mid anteroseptal defect  consistent with nontransmural scar and mild chhaya-infarct ischemia.  There is a second basal inferior inferior septal and inferolateral  defect which appears mostly fixed.   2. Gated images demonstrated normal LV cavity size and systolic  function.  The left ventricular systolic function is 79%.  3. Compared to the prior study from no prior study .    Procedure  Pharmacologic stress testing was performed with Lexiscan at a rate of  0.08 mg/ml rapid bolus injection, for 15 seconds, 0.4 mg/5ml  intravenously. Low-level exercise was performed along with the  vasodilator infusion.  The heart rate was 64 at baseline and lavinia to  105 beats per minute during the Lexiscan infusion. The rest blood  pressure was  179/95 mmHg and was 158/116 mm Hg during Lexiscan  infusion. The patient experienced no symptoms  during the test.    Myocardial perfusion imaging was performed at rest, approximately 45  minutes after the injection intravenously of 10 mCi of Tc-99m Myoview.  At peak pharmacologic effect, 10-20 seconds after Lexiscan,  the  patient was injected intravenously with 32 mCi of  Tc-99m Myoview. The  post-stress tomographic imaging was performed approximately 60 minutes  after stress.    EKG Findings  The resting EKG demonstrated normal sinus rhythm. The stress EKG  demonstrated no ischemic changes.    Tomographic Findings  Overall, the study quality is adequate . On the stress images, mild to  moderate count reduction is seen in the basal to mid anterior  anteroseptal wall and mild to moderate count reduction is seen in the  basal inferior inferior septal and inferolateral walls. On the rest  images, there is reversibility seen in the anteroseptal wall with  similar fixed pattern of decreased counts in the basal inferior  inferior septal and inferolateral walls. Results suggest a  nontransmural scar of the basal to mid anteroseptal wall with mild  chhaya-infarct ischemia and mostly fixed basal inferior inferior septal  and inferolateral defect . Gated images demonstrated normal LV cavity  size with end-diastolic volumes measuring 102 and 10 7 mL and rest and  stress respectively. LV systolic function appears normal. The left  ventricular ejection fraction was calculated to be 79%. TID was  absent.    DORY MOSS MD

## 2019-02-22 NOTE — PLAN OF CARE
A/Ox4, VSS ex htn, SBA with cane, denies pain or nausea. Right groin site dressing marked and unchanged, CMS intact, pulses 2+. Tele SR, voiding adequately.

## 2019-02-22 NOTE — PLAN OF CARE
A/Ox4, VSS ex htn, SBA with cane, denies pain or nausea. Patient arrived back from care suites around 1930. Right groin site dressing marked and unchanged, CMS intact, pulses 2+. Tele SR, voiding adequately.

## 2019-02-22 NOTE — PLAN OF CARE
A&Ox4.  VSS ex hypertensive, decrease with morning medications, and bradycardic at times.  Tele: SB.  Good appetite, tolerating regular diet.  Voiding spontaneously.  Independent with cane.  CMS intact.  LS clear.  Bowels active.  Right groin marked with no changes.  Denies all pain.  AVS reviewed with patient and spouse.  No questions/concerns.  IV removed. Tele removed.  All belongings with patient and family.  Patient discharged with family transport with transport aid via wheelchair to door.

## 2019-02-23 LAB — INTERPRETATION ECG - MUSE: NORMAL

## 2019-02-25 ENCOUNTER — TELEPHONE (OUTPATIENT)
Dept: CARDIOLOGY | Facility: CLINIC | Age: 54
End: 2019-02-25

## 2019-02-25 DIAGNOSIS — R07.9 CHEST PAIN: Primary | ICD-10-CM

## 2019-02-25 DIAGNOSIS — I10 HIGH BLOOD PRESSURE: Primary | ICD-10-CM

## 2019-02-25 LAB
ANNOTATION COMMENT IMP: ABNORMAL
METANEPHS SERPL-SCNC: 0.29 NMOL/L (ref 0–0.49)
NORMETANEPHRINE SERPL-SCNC: 1.01 NMOL/L (ref 0–0.89)

## 2019-02-25 NOTE — TELEPHONE ENCOUNTER
Metanephrine drawn 2-21-19 for review  Hospital discharge 2-22-19 - Per hospitalist discharge note = Patient had previously sweating episode and BP spike cardiac cath, cardiology was concerned about show pheochromocytoma, plasma metanephrines sent, awaiting results, discussed with the patient this will need to be followed up with his PCP

## 2019-02-25 NOTE — TELEPHONE ENCOUNTER
"Patient was evaluated by cardiology while inpatient for chest pain with exertion, diaphoresis, SEBASTIAN. PMH of CAD with YAMIL placed in 2016. 2/21/19 coronary angiogram via RFA shows normal coronary arteries with widely patent stent. Per Dr. Moy procedure note, \"This patient had impressive sweating and a blood pressure spike during the procedure. While it's possible this could be a reaction to the dye or fentanyl given the clinical history of spikes and blood pressure, I think a pheochromocytoma workup may be indicated. Metanephrines Plasma Free lab collected and stared on Maxzide and Coreg prior to discharge. Attempted to call patient to discuss any post hospital d/c questions he may have, review medication changes, and confirm f/u appts, but no answer. VM left to return my phone call. RN will confirm with patient that he needs to schedule f/u MARK OV. Order placed. WOJCIECH Danielle RN.  "

## 2019-02-26 RX ORDER — TRIAMTERENE AND HYDROCHLOROTHIAZIDE 37.5; 25 MG/1; MG/1
1 CAPSULE ORAL EVERY MORNING
COMMUNITY

## 2019-02-26 NOTE — TELEPHONE ENCOUNTER
Pt called back and requesting clarification of discharge medication list. States was taking Dyazide 37.5/25 mg tablets PTA and at time of discharge was ordered Maxzide 37.5/25 mg tablets, which was sent to his pharmacy. Writer reviewed Micromedex and both medications are the same. Verified with pt's [facility] in [place]. Pharmacist states has adequate refills of Dyazide ordered. Will adjust medication list to reflect this correction. WOJCIECH Danielle RN.

## 2019-02-26 NOTE — TELEPHONE ENCOUNTER
Writer returned pt's phone message. Pt denies any chest pain, SOB or lightheadedness. RFA is healing without signs of infection or swelling. Does state he has sensitivity to tapes, and has rashes at access and IV sites. Allergy list updated. Denies any medication questions. Scheduled to see his PMD on Monday and is waiting for return phone call and ruben't from his [place] cardiology office. Pt has no complaints and verbalized understanding of all instructions. Verbalized appreciation of all care during IP hospitalization. WOJCIECH Danielle RN.

## 2019-02-28 ENCOUNTER — TELEPHONE (OUTPATIENT)
Dept: CARDIOLOGY | Facility: CLINIC | Age: 54
End: 2019-02-28

## 2019-02-28 NOTE — TELEPHONE ENCOUNTER
Patient was discharged from [facility] on 2/22/19. Post-discharge the results for a plasma metanephrine panel showed an abnormal normetanephrine of 1.01. Per Dr. Peterson's recommendation, this result should be followed post discharge and CT abd considered.  In Care Everywhere, patient has been in contact with [facility] for a follow up plan.  Attempted to contact Dr. Hira Capellan's team @ [phone] and left a message for KALYAN Hurtado to call back. Will verify they have the results of the metanephrine panel for patient's next appointment and Dr. Capellan's review.    1045 spoke with KALYAN Hurtado, she will reach out to the patient and the PCP Dr. Dudley to ensure the metanephrine results are discussed. Dr. Dudley is in the [employer] system.

## 2019-10-01 ENCOUNTER — HEALTH MAINTENANCE LETTER (OUTPATIENT)
Age: 54
End: 2019-10-01

## 2019-12-15 ENCOUNTER — HEALTH MAINTENANCE LETTER (OUTPATIENT)
Age: 54
End: 2019-12-15

## 2020-03-22 ENCOUNTER — HEALTH MAINTENANCE LETTER (OUTPATIENT)
Age: 55
End: 2020-03-22

## 2021-01-15 ENCOUNTER — HEALTH MAINTENANCE LETTER (OUTPATIENT)
Age: 56
End: 2021-01-15

## 2021-02-26 ENCOUNTER — HOSPITAL ENCOUNTER (EMERGENCY)
Facility: CLINIC | Age: 56
Discharge: HOME | End: 2021-02-26
Attending: EMERGENCY MEDICINE | Admitting: EMERGENCY MEDICINE
Payer: COMMERCIAL

## 2021-02-26 ENCOUNTER — HOSPITAL ENCOUNTER (EMERGENCY)
Facility: CLINIC | Age: 56
Discharge: HOME | End: 2021-02-26
Payer: COMMERCIAL

## 2021-02-26 VITALS
WEIGHT: 160 LBS | RESPIRATION RATE: 20 BRPM | DIASTOLIC BLOOD PRESSURE: 121 MMHG | SYSTOLIC BLOOD PRESSURE: 199 MMHG | BODY MASS INDEX: 24.33 KG/M2 | TEMPERATURE: 97.8 F | OXYGEN SATURATION: 96 % | HEART RATE: 83 BPM

## 2021-02-26 VITALS
BODY MASS INDEX: 24.25 KG/M2 | HEART RATE: 62 BPM | SYSTOLIC BLOOD PRESSURE: 140 MMHG | DIASTOLIC BLOOD PRESSURE: 95 MMHG | TEMPERATURE: 97.8 F | OXYGEN SATURATION: 98 % | RESPIRATION RATE: 16 BRPM | HEIGHT: 68 IN | WEIGHT: 160 LBS

## 2021-02-26 DIAGNOSIS — G44.209 TENSION HEADACHE: ICD-10-CM

## 2021-02-26 DIAGNOSIS — I10 ESSENTIAL HYPERTENSION: ICD-10-CM

## 2021-02-26 LAB
ANION GAP SERPL CALCULATED.3IONS-SCNC: 8 MMOL/L (ref 3–14)
BASOPHILS # BLD AUTO: 0.2 10E9/L (ref 0–0.2)
BASOPHILS NFR BLD AUTO: 3 %
BUN SERPL-MCNC: 22 MG/DL (ref 7–30)
CALCIUM SERPL-MCNC: 8.7 MG/DL (ref 8.5–10.1)
CHLORIDE SERPL-SCNC: 108 MMOL/L (ref 94–109)
CO2 SERPL-SCNC: 20 MMOL/L (ref 20–32)
CREAT SERPL-MCNC: 1 MG/DL (ref 0.66–1.25)
DIFFERENTIAL METHOD BLD: NORMAL
EOSINOPHIL # BLD AUTO: 0.2 10E9/L (ref 0–0.7)
EOSINOPHIL NFR BLD AUTO: 3 %
ERYTHROCYTE [DISTWIDTH] IN BLOOD BY AUTOMATED COUNT: 11.9 % (ref 10–15)
GFR SERPL CREATININE-BSD FRML MDRD: 85 ML/MIN/{1.73_M2}
GLUCOSE SERPL-MCNC: 102 MG/DL (ref 70–99)
HCT VFR BLD AUTO: 47.5 % (ref 40–53)
HGB BLD-MCNC: 16.3 G/DL (ref 13.3–17.7)
INTERPRETATION ECG - MUSE: NORMAL
LYMPHOCYTES # BLD AUTO: 3.2 10E9/L (ref 0.8–5.3)
LYMPHOCYTES NFR BLD AUTO: 39 %
MCH RBC QN AUTO: 30.8 PG (ref 26.5–33)
MCHC RBC AUTO-ENTMCNC: 34.3 G/DL (ref 31.5–36.5)
MCV RBC AUTO: 90 FL (ref 78–100)
MONOCYTES # BLD AUTO: 0.6 10E9/L (ref 0–1.3)
MONOCYTES NFR BLD AUTO: 7 %
NEUTROPHILS # BLD AUTO: 4 10E9/L (ref 1.6–8.3)
NEUTROPHILS NFR BLD AUTO: 48 %
PLATELET # BLD AUTO: 297 10E9/L (ref 150–450)
PLATELET # BLD EST: NORMAL 10*3/UL
POTASSIUM SERPL-SCNC: 3.5 MMOL/L (ref 3.4–5.3)
RBC # BLD AUTO: 5.3 10E12/L (ref 4.4–5.9)
RBC MORPH BLD: NORMAL
SODIUM SERPL-SCNC: 136 MMOL/L (ref 133–144)
TROPONIN I SERPL-MCNC: <0.015 UG/L (ref 0–0.04)
WBC # BLD AUTO: 8.3 10E9/L (ref 4–11)

## 2021-02-26 PROCEDURE — 80048 BASIC METABOLIC PNL TOTAL CA: CPT | Performed by: EMERGENCY MEDICINE

## 2021-02-26 PROCEDURE — 84484 ASSAY OF TROPONIN QUANT: CPT | Performed by: EMERGENCY MEDICINE

## 2021-02-26 PROCEDURE — 250N000013 HC RX MED GY IP 250 OP 250 PS 637: Performed by: EMERGENCY MEDICINE

## 2021-02-26 PROCEDURE — 96374 THER/PROPH/DIAG INJ IV PUSH: CPT

## 2021-02-26 PROCEDURE — 258N000003 HC RX IP 258 OP 636: Performed by: EMERGENCY MEDICINE

## 2021-02-26 PROCEDURE — 250N000011 HC RX IP 250 OP 636: Performed by: EMERGENCY MEDICINE

## 2021-02-26 PROCEDURE — 93005 ELECTROCARDIOGRAM TRACING: CPT

## 2021-02-26 PROCEDURE — 96361 HYDRATE IV INFUSION ADD-ON: CPT

## 2021-02-26 PROCEDURE — 96375 TX/PRO/DX INJ NEW DRUG ADDON: CPT

## 2021-02-26 PROCEDURE — 99284 EMERGENCY DEPT VISIT MOD MDM: CPT | Mod: 25

## 2021-02-26 PROCEDURE — 85025 COMPLETE CBC W/AUTO DIFF WBC: CPT | Performed by: EMERGENCY MEDICINE

## 2021-02-26 RX ORDER — KETOROLAC TROMETHAMINE 15 MG/ML
15 INJECTION, SOLUTION INTRAMUSCULAR; INTRAVENOUS ONCE
Status: COMPLETED | OUTPATIENT
Start: 2021-02-26 | End: 2021-02-26

## 2021-02-26 RX ORDER — ACETAMINOPHEN 325 MG/1
975 TABLET ORAL ONCE
Status: COMPLETED | OUTPATIENT
Start: 2021-02-26 | End: 2021-02-26

## 2021-02-26 RX ORDER — METOCLOPRAMIDE HYDROCHLORIDE 5 MG/ML
10 INJECTION INTRAMUSCULAR; INTRAVENOUS ONCE
Status: COMPLETED | OUTPATIENT
Start: 2021-02-26 | End: 2021-02-26

## 2021-02-26 RX ORDER — LABETALOL HYDROCHLORIDE 5 MG/ML
20 INJECTION, SOLUTION INTRAVENOUS ONCE
Status: COMPLETED | OUTPATIENT
Start: 2021-02-26 | End: 2021-02-26

## 2021-02-26 RX ORDER — DIPHENHYDRAMINE HYDROCHLORIDE 50 MG/ML
25 INJECTION INTRAMUSCULAR; INTRAVENOUS ONCE
Status: COMPLETED | OUTPATIENT
Start: 2021-02-26 | End: 2021-02-26

## 2021-02-26 RX ADMIN — KETOROLAC TROMETHAMINE 15 MG: 15 INJECTION, SOLUTION INTRAMUSCULAR; INTRAVENOUS at 02:11

## 2021-02-26 RX ADMIN — METOCLOPRAMIDE 10 MG: 5 INJECTION, SOLUTION INTRAMUSCULAR; INTRAVENOUS at 01:02

## 2021-02-26 RX ADMIN — DIPHENHYDRAMINE HYDROCHLORIDE 25 MG: 50 INJECTION, SOLUTION INTRAMUSCULAR; INTRAVENOUS at 01:02

## 2021-02-26 RX ADMIN — SODIUM CHLORIDE 1000 ML: 9 INJECTION, SOLUTION INTRAVENOUS at 00:59

## 2021-02-26 RX ADMIN — ACETAMINOPHEN 975 MG: 325 TABLET, FILM COATED ORAL at 01:09

## 2021-02-26 RX ADMIN — LABETALOL HYDROCHLORIDE 20 MG: 5 INJECTION, SOLUTION INTRAVENOUS at 01:04

## 2021-02-26 ASSESSMENT — ENCOUNTER SYMPTOMS
SHORTNESS OF BREATH: 0
HEADACHES: 1
WEAKNESS: 0
NUMBNESS: 0
TREMORS: 1
NAUSEA: 0
DIAPHORESIS: 1

## 2021-02-26 ASSESSMENT — MIFFLIN-ST. JEOR: SCORE: 1535.26

## 2021-02-26 NOTE — ED PROVIDER NOTES
"  History   Chief Complaint:  Hypertension and Headache    HPI   Pranav Loo is a 55 year old male, s/p coronary stent with a history of hypertension followed by Dr. Capellan, who presents to the ED for evaluation of hypertension and headache. The patient reports over the last few days he has had an increase in his blood pressure. He states he has been working with Dr. Capellan to try and reduce his blood pressure with medications like Dyazide, losartan, and PRN hydralazine. The patient states his blood pressure started at 150/110 and then began to increase with his last reading around 190/120. In association with his blood pressure he says he has had a headache as well for which he tried taking ibuprofen at 2000 last night, but had no decrease in his headache. He says \" I feel as if my head is going to blow up\" and conveys that this happens when his blood pressure exceeds 150 systolic. The patient denies any chest pain, nausea, or shortness of breath. He does feel diaphoretic and shaky. He does not have any numbness, visual acuity deficits, or focal weakness. The patient says he takes his PRN hydralazine up to two doses mainly, but was instructed that if his blood pressure did not decrease he could take a third, which he did today without any help. He has been taking his medications as directed without any missed doses. He denies any drug or tobacco use. He has not consumed alcohol in the past two weeks as he says it affects his blood pressure.     Review of Systems   Constitutional: Positive for diaphoresis.   Eyes: Negative for visual disturbance.   Respiratory: Negative for shortness of breath.    Cardiovascular: Negative for chest pain.   Gastrointestinal: Negative for nausea.   Neurological: Positive for tremors and headaches. Negative for weakness and numbness.   All other systems reviewed and are negative.    Allergies:  Fentanyl  Penicillin G    Medications:  " "  Aspirin  Lipitor  Coreg  Plavix  Losartan  Nitroglycerin  Dyazide    Past Medical History:    DJD  Hypertension    Past Surgical History:    Right hip arthroplasty  Bilateral intraocular lens implantation  Optical tracking system resurfacing right hip  Heart catheterization with coronary stent    Family History:    CAD  Hypertension     Social History:  Smoking status: No  Alcohol use: No  Drug use: No  PCP: Danielle Harkins  Presents to the ED alone    Physical Exam     Patient Vitals for the past 24 hrs:   BP Temp Temp src Pulse Resp SpO2 Height Weight   02/26/21 0213 -- 97.8  F (36.6  C) Oral -- -- -- -- --   02/26/21 0200 (!) 134/95 -- -- 62 -- 98 % -- --   02/26/21 0145 (!) 158/115 -- -- -- -- 98 % -- --   02/26/21 0129 (!) 165/108 -- -- 66 -- -- -- --   02/26/21 0100 (!) 185/117 -- -- -- -- 99 % -- --   02/26/21 0033 (!) 202/128 -- Oral 94 16 97 % 1.727 m (5' 8\") 72.6 kg (160 lb)       Physical Exam  General: Patient is awake, alert and interactive when I enter the room  Head: The scalp, face, and head appear normal  Eyes: The pupils are equal, round, and reactive to light. Conjunctivae and sclerae are normal  CV: Regular rate and rhythm   Resp: Lungs are clear without wheezes or rales. No respiratory distress.   GI: Abdomen is soft, no rigidity, guarding, or rebound. No distension. No tenderness to palpation in any quadrant.     MS: Normal tone. Joints grossly normal without effusions. No asymmetric leg swelling, calf or thigh tenderness.    Skin: No rash or lesions noted. Normal capillary refill noted  Neuro: CN's II-XII without obvious abnormality.  Gait is normal w/o ataxia.  Neg Romberg.  5/5 UE and LE strength which is symmetrical.   Reflexes are 2+ and symmetrical. Negative Pronator drift.    Finger to Nose testing is intact bilaterally.    Light touch is symmetrical bilateral UE's and LE's.  PERRLA, EOMI.    No meningismus and full neck AROM/PROM.   Psych:  Normal affect.  Appropriate " interactions.    Emergency Department Course   ECG (01:09:45):  Rate 38 bpm. LA interval 130. QRS duration 92. QT/QTc 446/474. P-R-T axes 31 35 30. Normal sinus rhythm. Normal ECG. Compared to EKG on 2/21/19. Interpreted at 0115 by Dwayne Birmingham MD.    Laboratory:  CBC: WNL (WBC 8.3, HGB 16.3, )    BMP:  (H), o/w WNL (Creatinine 1.00)    Troponin (Collected 054): <0.015    Emergency Department Course:    Reviewed:  I reviewed the patient's nursing notes, vitals, past available medical records.     Assessments:  0032: I obtained history and examined the patient as noted above.     0218: I rechecked the patient and explained findings. The patient feels much improve and headache is almost resolved. Feels comfortable going home and is amendable with the plan of follow up with PCP or cardiology.    Interventions:  0059: NS 1L IV Bolus   0102: Reglan 10 mg IV  0102: Benadryl 25 mg IV  0104: Labetalol 20 mg IV  0109: Tylenol 975 mg PO  0211: Toradol 15 mg IV    Disposition:  The patient was discharged to home.    Impression & Plan    Medical Decision Making:  Pranav Loo is a 55 year old male who presents for evaluation of elevated blood pressure and headache.  There is history of hypertension in the past.  The workup here is negative and the patient does not have any clinical, laboratory, EKG or historical signs of end-organ dysfunction.  There are no signs of hypertensive emergency or urgency. The patient has had a headache over the past couple days and states it is worse when his blood pressure is over 150 systolic. He tried ibuprofen before coming to the emergency department today, but had no relief. After receiving Reglan, Benadryl, and Toradol, he had almost complete resolution of his symptoms and was feeling much better. After labetalol administration and time, the patient's blood pressure came down to 134/95 and he still had no end-organ dysfunction symptoms. Supportive outpatient  management is therefore indicated with close follow-up of primary care physician. The patient understands and is amendable to the plan.    Diagnosis:    ICD-10-CM    1. Tension headache  G44.209    2. Essential hypertension  I10      Scribe Disclosure:  I, Fredrick Dent, am serving as a scribe at 12:32 AM on 2/26/2021 to document services personally performed by Dwayne Birmingham MD at [facility] EMERGENCY DEPT based on my observations and the provider's statements to me.      Dwayne Birmingham MD  02/26/21 0336

## 2021-02-26 NOTE — ED TRIAGE NOTES
Pt reports high blood pressure and headache for past couple of days.  Pt states his home BP was 199/130.

## 2021-02-26 NOTE — ED TRIAGE NOTES
Pt arrives with complaints of high blood pressure and severe headache, pt says this feels like his typical headache with high blood pressure. He takes his BPs numerous times per day. He reports his BP has been greater than 140 SBP for the last three days. Has BP meds and PRN hydralazine. ABCs intact, A/O x4.

## 2021-03-20 ENCOUNTER — HEALTH MAINTENANCE LETTER (OUTPATIENT)
Age: 56
End: 2021-03-20

## 2021-09-04 ENCOUNTER — HEALTH MAINTENANCE LETTER (OUTPATIENT)
Age: 56
End: 2021-09-04

## 2022-02-17 NOTE — TELEPHONE ENCOUNTER
RECORDS RECEIVED FROM: Having pain and issues with (B) hips/ Self /[employer]  Hip surgery about 8 yrs ago   DATE RECEIVED: Mar 3, 2022     NOTES STATUS DETAILS   RETURNING PATIENT

## 2022-02-24 ASSESSMENT — HOOS JR
WALKING ON UNEVEN SURFACE: SEVERE
BENDING TO THE FLOOR TO PICK UP OBJECT: SEVERE
HOOS JR TOTAL INTERVAL SCORE: 32.74
SITTING: SEVERE
GOING UP OR DOWN STAIRS: SEVERE
RISING FROM SITTING: SEVERE
LYING IN BED (TURNING OVER, MAINTAINING HIP POSITION): SEVERE

## 2022-03-02 DIAGNOSIS — M25.551 RIGHT HIP PAIN: ICD-10-CM

## 2022-03-02 DIAGNOSIS — M25.552 LEFT HIP PAIN: Primary | ICD-10-CM

## 2022-03-03 ENCOUNTER — ANCILLARY PROCEDURE (OUTPATIENT)
Dept: GENERAL RADIOLOGY | Facility: CLINIC | Age: 57
End: 2022-03-03
Attending: ORTHOPAEDIC SURGERY
Payer: COMMERCIAL

## 2022-03-03 ENCOUNTER — OFFICE VISIT (OUTPATIENT)
Dept: ORTHOPEDICS | Facility: CLINIC | Age: 57
End: 2022-03-03
Payer: COMMERCIAL

## 2022-03-03 ENCOUNTER — LAB (OUTPATIENT)
Dept: LAB | Facility: CLINIC | Age: 57
End: 2022-03-03
Payer: COMMERCIAL

## 2022-03-03 ENCOUNTER — PRE VISIT (OUTPATIENT)
Dept: ORTHOPEDICS | Facility: CLINIC | Age: 57
End: 2022-03-03

## 2022-03-03 VITALS — WEIGHT: 161.1 LBS | HEIGHT: 67 IN | BODY MASS INDEX: 25.28 KG/M2

## 2022-03-03 DIAGNOSIS — M25.552 LEFT HIP PAIN: ICD-10-CM

## 2022-03-03 DIAGNOSIS — M25.551 RIGHT HIP PAIN: ICD-10-CM

## 2022-03-03 DIAGNOSIS — M25.552 LEFT HIP PAIN: Primary | ICD-10-CM

## 2022-03-03 LAB
CRP SERPL-MCNC: <2.9 MG/L (ref 0–8)
ERYTHROCYTE [SEDIMENTATION RATE] IN BLOOD BY WESTERGREN METHOD: 8 MM/HR (ref 0–20)

## 2022-03-03 PROCEDURE — 72100 X-RAY EXAM L-S SPINE 2/3 VWS: CPT | Performed by: RADIOLOGY

## 2022-03-03 PROCEDURE — 73522 X-RAY EXAM HIPS BI 3-4 VIEWS: CPT | Performed by: RADIOLOGY

## 2022-03-03 PROCEDURE — 99204 OFFICE O/P NEW MOD 45 MIN: CPT | Performed by: ORTHOPAEDIC SURGERY

## 2022-03-03 PROCEDURE — 85652 RBC SED RATE AUTOMATED: CPT | Performed by: PATHOLOGY

## 2022-03-03 PROCEDURE — 86140 C-REACTIVE PROTEIN: CPT | Performed by: PATHOLOGY

## 2022-03-03 PROCEDURE — 36415 COLL VENOUS BLD VENIPUNCTURE: CPT | Performed by: PATHOLOGY

## 2022-03-03 NOTE — LETTER
3/3/2022         RE: Pranav Loo  [address] [facility] [address]        Dear Colleague,    Thank you for referring your patient, Pranav Loo, to the [facility] [place]. Please see a copy of my visit note below.    Assessment: This is a 56 year old with several sources of symptoms including known DDD, meralgia paresthetica, adductor tendonitis, and abductor pain at the trochanter. This is in the setting of bilatearl hip hip arthroplasties and it is important to rule out an issue with his implants. He reports that he has right sided symptoms much more than left. His radiographs are normal without loosening/subsidence or wear. I have ordered a SED/CRP to look for occult infection. We discussed that if these are negative the next step would be to have his spine investigated and to address what appears to be inactivity related deconditioning that may be a risk factor for some of these symptoms.      Plan:  SED/CRP. Should these be normal next step is referral to PM&R as above         Chief Complaint: Consult (Bilateral hip pain. Worsening for a couple of years. No Injury. No treatment.)    Physician:  Referred Self    HPI: Pranav Loo is a 56 year old male who presents today for evaluation of    Symptom Profile  Location of symptoms:  Low back, greater trochanter, ASIS, groin, medial thigh. Right much worse than left   Onset: insidious   Trend: getting worse   Duration of symptoms: a year   Quality of symptoms: aching, sharp/stabbing  Severity: severe  Alleviate:activity modification, not standing up straight   Exacerbating: standing, walking. Independently points out that active hip flexion is asymptomatic    Previous Treatments: Previous treatments include activity modification, oral pain medication, use of a cane      Current Status:  Results of the patient s Hip Disability and Osteoarthritis Outcome Score (HOOS)  are as follows (0-100 scales with 100 being the  theoretical best):  Pain:  Symptoms:  ADLs:  Sports/Recreation:  Quality of Life:  (http://koos.nu/)  [facility] Activity Score:    MEDICAL HISTORY:   Past Medical History:   Diagnosis Date     DJD (degenerative joint disease)     left hip     Hypertension      Medications:     Current Outpatient Medications:      aspirin (ASA) 81 MG chewable tablet, Take 81 mg by mouth daily, Disp: , Rfl:      atorvastatin (LIPITOR) 40 MG tablet, Take 40 mg by mouth At Bedtime, Disp: , Rfl:      clindamycin (CLEOCIN) 300 MG capsule, Take 600 mg by mouth daily as needed (dental procedure.) 2 x 300 mg, Disp: , Rfl:      losartan (COZAAR) 100 MG tablet, Take 100 mg by mouth daily, Disp: , Rfl:      nitroGLYcerin (NITROSTAT) 0.4 MG sublingual tablet, Place 0.4 mg under the tongue every 5 minutes as needed for chest pain For chest pain place 1 tablet under the tongue every 5 minutes for 3 doses. If symptoms persist 5 minutes after 1st dose call 911., Disp: , Rfl:      potassium chloride (KLOR-CON) 20 MEQ packet, Take 20 mEq by mouth daily He is currently on the packet, Disp: , Rfl:      triamterene-HCTZ (DYAZIDE) 37.5-25 MG capsule, Take 1 capsule by mouth every morning, Disp: , Rfl:      carvedilol (COREG) 12.5 MG tablet, Take 1 tablet (12.5 mg) by mouth 2 times daily (with meals), Disp: 60 tablet, Rfl: 1     clopidogrel (PLAVIX) 75 MG tablet, Take 75 mg by mouth daily (Patient not taking: Reported on 3/3/2022), Disp: , Rfl:      diclofenac (VOLTAREN) 1 % topical gel, Place onto the skin daily as needed (pain in hands)  (Patient not taking: Reported on 3/3/2022), Disp: , Rfl:      Vitamin D, Cholecalciferol, 1000 units CAPS, Take 1,000 Units by mouth daily (Patient not taking: Reported on 3/3/2022), Disp: , Rfl:     Allergies: Fentanyl, Tape [adhesive tape], and Penicillin g    SURGICAL HISTORY:   Past Surgical History:   Procedure Laterality Date     ARTHROPLASTY HIP  11/5/2012    Procedure: ARTHROPLASTY HIP;  Right Total Hip Arthroplasty ;  " Surgeon: Reagan Ritter MD;  Location:  OR      PELVIS/HIP JOINT SURGERY UNLISTED       CV HEART CATHETERIZATION WITH POSSIBLE INTERVENTION N/A 2/21/2019    Procedure: Heart Catheterization with possible Intervention;  Surgeon: Karlos Moy MD;  Location:  HEART CARDIAC CATH LAB     EXTRACAPSULAR CATARACT EXTRATION WITH INTRAOCULAR LENS IMPLANT      bilateral     OPTICAL TRACKING SYSTEM RESURFACING HIP  3/30/2011    Procedure:OPTICAL TRACKING SYSTEM RESURFACING HIP; Surgeon:REAGAN RITTER; Location: OR       FAMILY HISTORY:   Family History   Problem Relation Age of Onset     Coronary Artery Disease Early Onset Brother 41       SOCIAL HISTORY:   Social History     Tobacco Use     Smoking status: Never Smoker     Smokeless tobacco: Not on file   Substance Use Topics     Alcohol use: Yes     Comment: couple times a week       REVIEW OF SYSTEMS:  The comprehensive review of systems from the intake form was reviewed with the patient.  No fever, weight change or fatigue. No dry eyes. No oral ulcers, sore throat or voice change. No palpitations, syncope, angina or edema.  No chest pain, excessive sleepiness, shortness of breath or hemoptysis.   No abdominal pain, nausea, vomiting, diarrhea or heartburn.  No skin rash. No focal weakness or numbness. No bleeding or lymphadenopathy. No rhinitis or hives.     Exam:  On physical examination the patient appears the stated age, is in no acute distress, affectThe is appropriate, and breathing is non-labored.  Vitals are documented in the EMR and have been reviewed:    Ht 1.689 m (5' 6.5\")   Wt 73.1 kg (161 lb 1.6 oz)   BMI 25.61 kg/m    5' 6.5\"  Body mass index is 25.61 kg/m .    Rises from chair:  Gait:slow, forward flexed, no clear gait pattern other than slow with small strides   Trendelenburg test:  Gains the exam table: easily   Hip examination is dominated by LBP and soreness with palpation superiofically. ACtive hip flexion standing is 90+ bilateral " and asymptomatic   TTP at the medial aspect of the ASIS, very TTP along the adductors and at the level of the greater trochanter.   Distally, the circulatory, motor, and sensation exam is intact with 5/5 EHL, gastroc-soleus, and tibialis anterior.  EHL/TA causes pain to radiate along the anterior thigh. Sensation to light touch is intact.  Dorsalis pedis and posterior tibialis pulses are palpable.  There are no sores on the feet, no bruising, and no lymphedema.    X-rays:   We reviewed the radiographs together. These show the normal progression for the right  total hip arthroplasty and left hip resurfacing arthroplasty both without evidence of loosening or subsidence.     Answers for HPI/ROS submitted by the patient on 2/24/2022  General Symptoms: No  Skin Symptoms: No  HENT Symptoms: No  EYE SYMPTOMS: No  HEART SYMPTOMS: No  LUNG SYMPTOMS: No  INTESTINAL SYMPTOMS: No  URINARY SYMPTOMS: No  REPRODUCTIVE SYMPTOMS: No  SKELETAL SYMPTOMS: No  BLOOD SYMPTOMS: No  NERVOUS SYSTEM SYMPTOMS: No  MENTAL HEALTH SYMPTOMS: No      Reagan Miller MD

## 2022-03-03 NOTE — NURSING NOTE
"Reason For Visit:   Chief Complaint   Patient presents with     Consult     Bilateral hip pain. Worsening for a couple of years. No Injury. No treatment.       Ht 1.689 m (5' 6.5\")   Wt 73.1 kg (161 lb 1.6 oz)   BMI 25.61 kg/m           Elicia Schwab ATC    "

## 2022-03-03 NOTE — PROGRESS NOTES
Assessment: This is a 56 year old with several sources of symptoms including known DDD, meralgia paresthetica, adductor tendonitis, and abductor pain at the trochanter. This is in the setting of bilatearl hip hip arthroplasties and it is important to rule out an issue with his implants. He reports that he has right sided symptoms much more than left. His radiographs are normal without loosening/subsidence or wear. I have ordered a SED/CRP to look for occult infection. We discussed that if these are negative the next step would be to have his spine investigated and to address what appears to be inactivity related deconditioning that may be a risk factor for some of these symptoms.      Plan:  SED/CRP. Should these be normal next step is referral to PM&R as above         Chief Complaint: Consult (Bilateral hip pain. Worsening for a couple of years. No Injury. No treatment.)      Physician:  Referred Self    HPI: Pranav Loo is a 56 year old male who presents today for evaluation of    Symptom Profile  Location of symptoms:  Low back, greater trochanter, ASIS, groin, medial thigh. Right much worse than left   Onset: insidious   Trend: getting worse   Duration of symptoms: a year   Quality of symptoms: aching, sharp/stabbing  Severity: severe  Alleviate:activity modification, not standing up straight   Exacerbating: standing, walking. Independently points out that active hip flexion is asymptomatic    Previous Treatments: Previous treatments include activity modification, oral pain medication, use of a cane      Current Status:  Results of the patient s Hip Disability and Osteoarthritis Outcome Score (HOOS)  are as follows (0-100 scales with 100 being the theoretical best):  Pain:  Symptoms:  ADLs:  Sports/Recreation:  Quality of Life:  (http://koos.nu/)  [employer] Activity Score:    MEDICAL HISTORY:   Past Medical History:   Diagnosis Date     DJD (degenerative joint disease)     left hip     Hypertension         Medications:     Current Outpatient Medications:      aspirin (ASA) 81 MG chewable tablet, Take 81 mg by mouth daily, Disp: , Rfl:      atorvastatin (LIPITOR) 40 MG tablet, Take 40 mg by mouth At Bedtime, Disp: , Rfl:      clindamycin (CLEOCIN) 300 MG capsule, Take 600 mg by mouth daily as needed (dental procedure.) 2 x 300 mg, Disp: , Rfl:      losartan (COZAAR) 100 MG tablet, Take 100 mg by mouth daily, Disp: , Rfl:      nitroGLYcerin (NITROSTAT) 0.4 MG sublingual tablet, Place 0.4 mg under the tongue every 5 minutes as needed for chest pain For chest pain place 1 tablet under the tongue every 5 minutes for 3 doses. If symptoms persist 5 minutes after 1st dose call 911., Disp: , Rfl:      potassium chloride (KLOR-CON) 20 MEQ packet, Take 20 mEq by mouth daily He is currently on the packet, Disp: , Rfl:      triamterene-HCTZ (DYAZIDE) 37.5-25 MG capsule, Take 1 capsule by mouth every morning, Disp: , Rfl:      carvedilol (COREG) 12.5 MG tablet, Take 1 tablet (12.5 mg) by mouth 2 times daily (with meals), Disp: 60 tablet, Rfl: 1     clopidogrel (PLAVIX) 75 MG tablet, Take 75 mg by mouth daily (Patient not taking: Reported on 3/3/2022), Disp: , Rfl:      diclofenac (VOLTAREN) 1 % topical gel, Place onto the skin daily as needed (pain in hands)  (Patient not taking: Reported on 3/3/2022), Disp: , Rfl:      Vitamin D, Cholecalciferol, 1000 units CAPS, Take 1,000 Units by mouth daily (Patient not taking: Reported on 3/3/2022), Disp: , Rfl:     Allergies: Fentanyl, Tape [adhesive tape], and Penicillin g    SURGICAL HISTORY:   Past Surgical History:   Procedure Laterality Date     ARTHROPLASTY HIP  11/5/2012    Procedure: ARTHROPLASTY HIP;  Right Total Hip Arthroplasty ;  Surgeon: Reagan Miller MD;  Location:  OR      PELVIS/HIP JOINT SURGERY UNLISTED       CV HEART CATHETERIZATION WITH POSSIBLE INTERVENTION N/A 2/21/2019    Procedure: Heart Catheterization with possible Intervention;  Surgeon: Chinmay  "Karlos ARCHER MD;  Location:  HEART CARDIAC CATH LAB     EXTRACAPSULAR CATARACT EXTRATION WITH INTRAOCULAR LENS IMPLANT      bilateral     OPTICAL TRACKING SYSTEM RESURFACING HIP  3/30/2011    Procedure:OPTICAL TRACKING SYSTEM RESURFACING HIP; Surgeon:LORENA RITTER; Location: OR       FAMILY HISTORY:   Family History   Problem Relation Age of Onset     Coronary Artery Disease Early Onset Brother 41       SOCIAL HISTORY:   Social History     Tobacco Use     Smoking status: Never Smoker     Smokeless tobacco: Not on file   Substance Use Topics     Alcohol use: Yes     Comment: couple times a week       REVIEW OF SYSTEMS:  The comprehensive review of systems from the intake form was reviewed with the patient.  No fever, weight change or fatigue. No dry eyes. No oral ulcers, sore throat or voice change. No palpitations, syncope, angina or edema.  No chest pain, excessive sleepiness, shortness of breath or hemoptysis.   No abdominal pain, nausea, vomiting, diarrhea or heartburn.  No skin rash. No focal weakness or numbness. No bleeding or lymphadenopathy. No rhinitis or hives.     Exam:  On physical examination the patient appears the stated age, is in no acute distress, affectThe is appropriate, and breathing is non-labored.  Vitals are documented in the EMR and have been reviewed:    Ht 1.689 m (5' 6.5\")   Wt 73.1 kg (161 lb 1.6 oz)   BMI 25.61 kg/m    5' 6.5\"  Body mass index is 25.61 kg/m .    Rises from chair:  Gait:slow, forward flexed, no clear gait pattern other than slow with small strides   Trendelenburg test:  Gains the exam table: easily   Hip examination is dominated by LBP and soreness with palpation superiofically. ACtive hip flexion standing is 90+ bilateral and asymptomatic   TTP at the medial aspect of the ASIS, very TTP along the adductors and at the level of the greater trochanter.   Distally, the circulatory, motor, and sensation exam is intact with 5/5 EHL, gastroc-soleus, and tibialis " anterior.  EHL/TA causes pain to radiate along the anterior thigh. Sensation to light touch is intact.  Dorsalis pedis and posterior tibialis pulses are palpable.  There are no sores on the feet, no bruising, and no lymphedema.    X-rays:   We reviewed the radiographs together. These show the normal progression for the right  total hip arthroplasty and left hip resurfacing arthroplasty both without evidence of loosening or subsidence.     Answers for HPI/ROS submitted by the patient on 2/24/2022  General Symptoms: No  Skin Symptoms: No  HENT Symptoms: No  EYE SYMPTOMS: No  HEART SYMPTOMS: No  LUNG SYMPTOMS: No  INTESTINAL SYMPTOMS: No  URINARY SYMPTOMS: No  REPRODUCTIVE SYMPTOMS: No  SKELETAL SYMPTOMS: No  BLOOD SYMPTOMS: No  NERVOUS SYSTEM SYMPTOMS: No  MENTAL HEALTH SYMPTOMS: No

## 2022-03-11 NOTE — PROGRESS NOTES
"CHIEF COMPLAINT:  Pain of the Lower Back, Pain of the Right Hip, and Pain of the Left Hip     HISTORY OF PRESENT ILLNESS  Mr. Loo is a pleasant 56 year old year old male who presents to clinic today with low back and bilateral hip pain.  Pranav explains that he has a history of bilateral total hip arthroplasty with Dr. Miller.  Since his most recent hip arthroplasty on the right, he has had worsening hip pain as well as back pain.  He was seen evaluated recently by Dr. Miller about 2 weeks ago.  At that visit periprosthetic infection, hardware failure was ruled out.  He was then referred on to medical spine evaluation for possible lumbar contribution to his low back and bilateral hip pain.    He did have an x-ray with Dr. Miller revealing degenerative disc disease.  He has a history of chronic low back pain as well, but not to this degree.  Has been significantly worse in the past 2 months.  He notes he has had a history of \"shots\" in his back with his neurologist.  He is only provided temporizing relief and he is looking for more definitive treatment if able.    He describes pain localizing to the low lumbar region, into the iliac crests, ASIS of his hips, into the proximal abductor region and anterior thighs.  Right side is greater than the left he has been using a cane for ambulation due to this issue.    Low back: 10 years ago  Onset: gradual  Location: low back  Quality:  sharp  Duration: 10 years   Severity: 10/10 at worst  Timing:constant ADLs and physical activity, sitting is the worst  Modifying factors:  resting and non-use makes it better, movement and use makes it worse  Associated signs & symptoms: stiffness  Previous similar pain: Yes  Treatments to date: 3 CSI 5 years ago - not helpful    Bilateral hips -MEG in 2011 and 2012  Onset: gradual   Location: bilateral hip pain joint, troch  Quality:  Sharp, pins and needles   Duration: 10 years   Severity: 10/10 at worst  Timing:constant ADLs makes it " worse  Modifying factors:  resting and non-use makes it better, movement and use makes it worse  Associated signs & symptoms: numbness and tingling limited ROM, pressure   Previous similar pain: Yes  Treatments to date: stretching, walking and stationary bike    Additional history: as documented    Review of Systems:    Have you recently had a a fever, chills, weight loss? No    Do you have any vision problems? No    Do you have any chest pain or edema? No    Do you have any shortness of breath or wheezing?  No    Do you have stomach problems? No    Do you have any numbness or focal weakness? No    Do you have diabetes? No    Do you have problems with bleeding or clotting? No    Do you have an rashes or other skin lesions? No    MEDICAL HISTORY  Patient Active Problem List   Diagnosis     Osteoarthritis of hip     Left hip pain     Abnormality of gait     Other postprocedural status(V45.89)     S/P total hip arthroplasty     High blood pressure     Right hip pain     Chest pain       Current Outpatient Medications   Medication Sig Dispense Refill     aspirin (ASA) 81 MG chewable tablet Take 81 mg by mouth daily       atorvastatin (LIPITOR) 40 MG tablet Take 40 mg by mouth At Bedtime       clindamycin (CLEOCIN) 300 MG capsule Take 600 mg by mouth daily as needed (dental procedure.) 2 x 300 mg       losartan (COZAAR) 100 MG tablet Take 100 mg by mouth daily       nitroGLYcerin (NITROSTAT) 0.4 MG sublingual tablet Place 0.4 mg under the tongue every 5 minutes as needed for chest pain For chest pain place 1 tablet under the tongue every 5 minutes for 3 doses. If symptoms persist 5 minutes after 1st dose call 911.       potassium chloride (KLOR-CON) 20 MEQ packet Take 20 mEq by mouth daily He is currently on the packet       triamterene-HCTZ (DYAZIDE) 37.5-25 MG capsule Take 1 capsule by mouth every morning       carvedilol (COREG) 12.5 MG tablet Take 1 tablet (12.5 mg) by mouth 2 times daily (with meals) 60 tablet 1      clopidogrel (PLAVIX) 75 MG tablet Take 75 mg by mouth daily (Patient not taking: Reported on 3/3/2022)       diclofenac (VOLTAREN) 1 % topical gel Place onto the skin daily as needed (pain in hands)  (Patient not taking: Reported on 3/3/2022)       Vitamin D, Cholecalciferol, 1000 units CAPS Take 1,000 Units by mouth daily (Patient not taking: Reported on 3/3/2022)         Allergies   Allergen Reactions     Fentanyl Nausea and Vomiting     Unknown if Fentanyl or Versed - pt had N&V after both heart caths after receiving sedation     Tape [Adhesive Tape] Rash     Penicillin G      Unknown reaction during childhood       Family History   Problem Relation Age of Onset     Coronary Artery Disease Early Onset Brother 41       Additional medical/Social/Surgical histories reviewed in [facility] and updated as appropriate.       PHYSICAL EXAM  BP (!) 152/90   Wt 73 kg (161 lb)   BMI 25.60 kg/m      General  - normal appearance, in no obvious distress  HEENT  - conjunctivae not injected, moist mucous membranes  CV  - normal peripheral perfusion  Pulm  - normal respiratory pattern, non-labored  Musculoskeletal - lumbar spine  - stance: slow to rise and sit  - inspection: normal bone and joint alignment, no obvious scoliosis  - palpation: bilateral lumbar paravertebral spasm  - ROM: pain with bilateral rotation, flexion past 30 deg, extension  - strength: lower extremities 5/5 in all planes  - special tests:  (-) straight leg raise bilaterally  (-) slump test  Neuro  - patellar and Achilles DTRs 2+ bilaterally, no sensory or motor deficit, grossly normal coordination, normal muscle tone  Skin  - no ecchymosis, erythema, warmth, or induration, no obvious rash  Psych  - interactive, appropriate, normal mood and affect        IMAGING :   3 views pelvis radiograph(s) 3/3/2022 12:42 PM     History: Left hip pain; Right hip pain     Comparison: 12/18/2012     Findings:     3 view(s) of the pelvis were obtained.      Redemonstration of  bilateral total hip arthroplasties. 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 On the crosstable  lateral view there is also lucency surrounding the more inferior screw  noted. The components are otherwise well seated.      On the left: Total hip arthroplasty with short stem, well seated.     Sacrum and innominate bones are partially obscured by overlying bowel  gas/fecal content.     Pelvic phlebolith.                                                                      Impression:  1. Right total hip arthroplasty: In close proximity to the acetabular  component there is a lucency, most consistent with possible acetabular  osteolyses, including lucency surrounding the more inferior screw seen  on the crosstable lateral view.  2. Stable left total hip arthroplasty.     JUTTA ELLERMANN, MD     2 views lumbar spine radiographs 3/3/2022 1:12 PM     History: Left hip pain; Right hip pain     Comparison: 1/2/2014     Findings:     Standing  AP and lateral  views of the lumbar spine were obtained.     5  lumbar type vertebral bodies are assumed for the purpose of this  dictation.     There is no acute osseous abnormality.       There are multilevel degenerative changes of the lumbar spine, most  pronounced at L4/L5 . There is also lower lumbar predominant facet  arthropathy. Redemonstration of endplate remodeling at the  thoracolumbar junction     Vascular calcifications. The visualized bowel gas pattern is  non-obstructive.                                                                      Impression:  1.  No acute osseous abnormality.  2.  Multilevel degenerative changes most pronounced at L4-L5.     JUTTA ELLERMANN, MD     LABS:  ESR 8  CRP <2.9     ASSESSMENT & PLAN  Mr. Loo is a 56 year old year old gentleman with possible history of bilateral hip arthroplasty as well as lumbar degenerative disc disease presenting for discussion of  acute on chronic low back pain.  Given the litany of his symptoms of his hips, abductor region, trochanteric region in the setting of least moderate degenerative disc disease, would like to further rule out spinal or neuroforaminal stenosis as a possible cause for this diffuse pain.    Diagnosis: History of bilateral total hip arthroplasty, lumbar degenerative disc disease, chronic low back pain    MRI lumbar spine ordered to further elucidate lumbar pathology.  He has had previous corticosteroid injections and physical therapy will be amenable to surgical consultation if there is pathology warranting it.  I would send him on to neurosurgery if this is the case.  Otherwise I have encouraged him to continue with his PMNR follow-up as referred by Dr. Miller.  They can look more closely at myofascial component of his symptoms once lumbar pathology has been appropriately addressed.    It was a pleasure seeing Pranav today.    Sabino Snider DO, [facility]  Primary Care Sports Medicine    47 minutes on date of the encounter doing chart review, history and examination, independent imaging review, documentation, and additional activities noted above.

## 2022-03-17 ENCOUNTER — OFFICE VISIT (OUTPATIENT)
Dept: ORTHOPEDICS | Facility: CLINIC | Age: 57
End: 2022-03-17
Payer: COMMERCIAL

## 2022-03-17 VITALS — DIASTOLIC BLOOD PRESSURE: 90 MMHG | SYSTOLIC BLOOD PRESSURE: 152 MMHG | BODY MASS INDEX: 25.6 KG/M2 | WEIGHT: 161 LBS

## 2022-03-17 DIAGNOSIS — M51.369 LUMBAR DEGENERATIVE DISC DISEASE: Primary | ICD-10-CM

## 2022-03-17 PROCEDURE — 99204 OFFICE O/P NEW MOD 45 MIN: CPT | Performed by: FAMILY MEDICINE

## 2022-03-17 NOTE — LETTER
"    3/17/2022         RE: Pranav Loo  [address] [facility] [address]        Dear Colleague,    Thank you for referring your patient, Pranav Loo, to the [facility] [place]. Please see a copy of my visit note below.    CHIEF COMPLAINT:  Pain of the Lower Back, Pain of the Right Hip, and Pain of the Left Hip     HISTORY OF PRESENT ILLNESS  Mr. Loo is a pleasant 56 year old year old male who presents to clinic today with low back and bilateral hip pain.  Pranav explains that he has a history of bilateral total hip arthroplasty with Dr. Miller.  Since his most recent hip arthroplasty on the right, he has had worsening hip pain as well as back pain.  He was seen evaluated recently by Dr. Miller about 2 weeks ago.  At that visit periprosthetic infection, hardware failure was ruled out.  He was then referred on to medical spine evaluation for possible lumbar contribution to his low back and bilateral hip pain.    He did have an x-ray with Dr. Miller revealing degenerative disc disease.  He has a history of chronic low back pain as well, but not to this degree.  Has been significantly worse in the past 2 months.  He notes he has had a history of \"shots\" in his back with his neurologist.  He is only provided temporizing relief and he is looking for more definitive treatment if able.    He describes pain localizing to the low lumbar region, into the iliac crests, ASIS of his hips, into the proximal abductor region and anterior thighs.  Right side is greater than the left he has been using a cane for ambulation due to this issue.    Low back: 10 years ago  Onset: gradual  Location: low back  Quality:  sharp  Duration: 10 years   Severity: 10/10 at worst  Timing:constant ADLs and physical activity, sitting is the worst  Modifying factors:  resting and non-use makes it better, movement and use makes it worse  Associated signs & symptoms: stiffness  Previous similar pain: " Yes  Treatments to date: 3 CSI 5 years ago - not helpful    Bilateral hips -MEG in 2011 and 2012  Onset: gradual   Location: bilateral hip pain joint, troch  Quality:  Sharp, pins and needles   Duration: 10 years   Severity: 10/10 at worst  Timing:constant ADLs makes it worse  Modifying factors:  resting and non-use makes it better, movement and use makes it worse  Associated signs & symptoms: numbness and tingling limited ROM, pressure   Previous similar pain: Yes  Treatments to date: stretching, walking and stationary bike    Additional history: as documented    Review of Systems:    Have you recently had a a fever, chills, weight loss? No    Do you have any vision problems? No    Do you have any chest pain or edema? No    Do you have any shortness of breath or wheezing?  No    Do you have stomach problems? No    Do you have any numbness or focal weakness? No    Do you have diabetes? No    Do you have problems with bleeding or clotting? No    Do you have an rashes or other skin lesions? No    MEDICAL HISTORY  Patient Active Problem List   Diagnosis     Osteoarthritis of hip     Left hip pain     Abnormality of gait     Other postprocedural status(V45.89)     S/P total hip arthroplasty     High blood pressure     Right hip pain     Chest pain       Current Outpatient Medications   Medication Sig Dispense Refill     aspirin (ASA) 81 MG chewable tablet Take 81 mg by mouth daily       atorvastatin (LIPITOR) 40 MG tablet Take 40 mg by mouth At Bedtime       clindamycin (CLEOCIN) 300 MG capsule Take 600 mg by mouth daily as needed (dental procedure.) 2 x 300 mg       losartan (COZAAR) 100 MG tablet Take 100 mg by mouth daily       nitroGLYcerin (NITROSTAT) 0.4 MG sublingual tablet Place 0.4 mg under the tongue every 5 minutes as needed for chest pain For chest pain place 1 tablet under the tongue every 5 minutes for 3 doses. If symptoms persist 5 minutes after 1st dose call 911.       potassium chloride (KLOR-CON) 20  MEQ packet Take 20 mEq by mouth daily He is currently on the packet       triamterene-HCTZ (DYAZIDE) 37.5-25 MG capsule Take 1 capsule by mouth every morning       carvedilol (COREG) 12.5 MG tablet Take 1 tablet (12.5 mg) by mouth 2 times daily (with meals) 60 tablet 1     clopidogrel (PLAVIX) 75 MG tablet Take 75 mg by mouth daily (Patient not taking: Reported on 3/3/2022)       diclofenac (VOLTAREN) 1 % topical gel Place onto the skin daily as needed (pain in hands)  (Patient not taking: Reported on 3/3/2022)       Vitamin D, Cholecalciferol, 1000 units CAPS Take 1,000 Units by mouth daily (Patient not taking: Reported on 3/3/2022)         Allergies   Allergen Reactions     Fentanyl Nausea and Vomiting     Unknown if Fentanyl or Versed - pt had N&V after both heart caths after receiving sedation     Tape [Adhesive Tape] Rash     Penicillin G      Unknown reaction during childhood       Family History   Problem Relation Age of Onset     Coronary Artery Disease Early Onset Brother 41       Additional medical/Social/Surgical histories reviewed in [facility] and updated as appropriate.       PHYSICAL EXAM  BP (!) 152/90   Wt 73 kg (161 lb)   BMI 25.60 kg/m      General  - normal appearance, in no obvious distress  HEENT  - conjunctivae not injected, moist mucous membranes  CV  - normal peripheral perfusion  Pulm  - normal respiratory pattern, non-labored  Musculoskeletal - lumbar spine  - stance: slow to rise and sit  - inspection: normal bone and joint alignment, no obvious scoliosis  - palpation: bilateral lumbar paravertebral spasm  - ROM: pain with bilateral rotation, flexion past 30 deg, extension  - strength: lower extremities 5/5 in all planes  - special tests:  (-) straight leg raise bilaterally  (-) slump test  Neuro  - patellar and Achilles DTRs 2+ bilaterally, no sensory or motor deficit, grossly normal coordination, normal muscle tone  Skin  - no ecchymosis, erythema, warmth, or induration, no obvious  rash  Psych  - interactive, appropriate, normal mood and affect        IMAGING :   3 views pelvis radiograph(s) 3/3/2022 12:42 PM     History: Left hip pain; Right hip pain     Comparison: 12/18/2012     Findings:     3 view(s) of the pelvis were obtained.      Redemonstration of bilateral total hip arthroplasties. 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 On the crosstable  lateral view there is also lucency surrounding the more inferior screw  noted. The components are otherwise well seated.      On the left: Total hip arthroplasty with short stem, well seated.     Sacrum and innominate bones are partially obscured by overlying bowel  gas/fecal content.     Pelvic phlebolith.                                                                      Impression:  1. Right total hip arthroplasty: In close proximity to the acetabular  component there is a lucency, most consistent with possible acetabular  osteolyses, including lucency surrounding the more inferior screw seen  on the crosstable lateral view.  2. Stable left total hip arthroplasty.     JUTTA ELLERMANN, MD     2 views lumbar spine radiographs 3/3/2022 1:12 PM     History: Left hip pain; Right hip pain     Comparison: 1/2/2014     Findings:     Standing  AP and lateral  views of the lumbar spine were obtained.     5  lumbar type vertebral bodies are assumed for the purpose of this  dictation.     There is no acute osseous abnormality.       There are multilevel degenerative changes of the lumbar spine, most  pronounced at L4/L5 . There is also lower lumbar predominant facet  arthropathy. Redemonstration of endplate remodeling at the  thoracolumbar junction     Vascular calcifications. The visualized bowel gas pattern is  non-obstructive.                                                                      Impression:  1.  No acute osseous abnormality.  2.  Multilevel degenerative  changes most pronounced at L4-L5.     JUTTA ELLERMANN, MD     LABS:  ESR 8  CRP <2.9     ASSESSMENT & PLAN  Mr. Loo is a 56 year old year old gentleman with possible history of bilateral hip arthroplasty as well as lumbar degenerative disc disease presenting for discussion of acute on chronic low back pain.  Given the litany of his symptoms of his hips, abductor region, trochanteric region in the setting of least moderate degenerative disc disease, would like to further rule out spinal or neuroforaminal stenosis as a possible cause for this diffuse pain.    Diagnosis: History of bilateral total hip arthroplasty, lumbar degenerative disc disease, chronic low back pain    MRI lumbar spine ordered to further elucidate lumbar pathology.  He has had previous corticosteroid injections and physical therapy will be amenable to surgical consultation if there is pathology warranting it.  I would send him on to neurosurgery if this is the case.  Otherwise I have encouraged him to continue with his PMNR follow-up as referred by Dr. Miller.  They can look more closely at myofascial component of his symptoms once lumbar pathology has been appropriately addressed.    It was a pleasure seeing Pranav today.    Sabino Snider DO, [facility]  Primary Care Sports Medicine    47 minutes on date of the encounter doing chart review, history and examination, independent imaging review, documentation, and additional activities noted above.          Again, thank you for allowing me to participate in the care of your patient.        Sincerely,        Sabino Snider DO

## 2022-03-17 NOTE — PATIENT INSTRUCTIONS
Thank you for choosing [facility]    [facility] LOCATIONS  [place] [address] [address] [facility], [address]th [place], [address] [place] [address]   [phone]       APPOINTMENTS: [phone]    CARE QUESTIONS: [phone], #3    BILLING QUESTIONS: [phone]    FAX NUMBER: [phone]        Follow up: after MRI to review findings.     An order for an MRI was placed today. You may call directly to schedule at [phone] at your convenience.

## 2022-03-28 ENCOUNTER — HOSPITAL ENCOUNTER (OUTPATIENT)
Dept: MRI IMAGING | Facility: CLINIC | Age: 57
Discharge: HOME | End: 2022-03-28
Attending: FAMILY MEDICINE | Admitting: FAMILY MEDICINE
Payer: COMMERCIAL

## 2022-03-28 DIAGNOSIS — M51.369 LUMBAR DEGENERATIVE DISC DISEASE: ICD-10-CM

## 2022-03-28 PROCEDURE — 72148 MRI LUMBAR SPINE W/O DYE: CPT

## 2022-04-15 ENCOUNTER — VIRTUAL VISIT (OUTPATIENT)
Dept: ORTHOPEDICS | Facility: CLINIC | Age: 57
End: 2022-04-15
Payer: COMMERCIAL

## 2022-04-15 ENCOUNTER — TELEPHONE (OUTPATIENT)
Dept: NEUROSURGERY | Facility: CLINIC | Age: 57
End: 2022-04-15

## 2022-04-15 DIAGNOSIS — M48.061 SPINAL STENOSIS OF LUMBAR REGION WITHOUT NEUROGENIC CLAUDICATION: Primary | ICD-10-CM

## 2022-04-15 DIAGNOSIS — M51.369 LUMBAR DEGENERATIVE DISC DISEASE: ICD-10-CM

## 2022-04-15 PROCEDURE — 99213 OFFICE O/P EST LOW 20 MIN: CPT | Mod: 95 | Performed by: FAMILY MEDICINE

## 2022-04-15 NOTE — PROGRESS NOTES
ESTABLISHED PATIENT FOLLOW-UP VIRTUAL:  No chief complaint on file.       This encounter was conducted via telephone in lieu of face-to-face encounter due to precautions implemented during COVID-19 pandemic.     What phone number would you like to be contacted at? [phone]  How would you like to obtain your AVS? MyChart      HISTORY OF PRESENT ILLNESS  Mr. Loo is a pleasant 56 year old year old male who presents via telephone today for follow-up of lumbar MRI results    Date of injury: NA  Date last seen: 3/17/22  Following Therapeutic Plan: Yes, MRI  Pain: Unchanged  Function: Unchanged  Interval History: Increase in pain with more motion.  Continues to experience pain of low back sometimes unpredictably with motion.      Has had previous injections with modest but temporary improvement.  Plan for PT addressing hip issues with Dr. Miller, but feels he cannot adequately perform due to back pain.    Additional medical/Social/Surgical histories reviewed in [facility] and updated as appropriate.    REVIEW OF SYSTEMS (4/15/2022)  CONSTITUTIONAL: Denies fever and weight loss  GASTROINTESTINAL: Denies abdominal pain, nausea, vomiting  MUSCULOSKELETAL: See HPI  SKIN: Denies any recent rash or lesion  NEUROLOGICAL: Denies numbness or focal weakness    IMAGING :   MR LUMBAR SPINE    IMPRESSION:  1.  Moderate to severe L4-L5 degenerative disc disease with multilevel  mild degenerative disc disease elsewhere. This is accompanied by a  small amount of type I Modic endplate degenerative change at L1-L2 and  L4-L5.     2.  Multilevel facet arthropathy is mild to moderate bilaterally at  L2-L3 but more mild elsewhere.     3.  Multilevel mild spinal canal stenosis with no high-grade spinal  canal stenosis at any level.     4.  Multilevel mild bilateral neural foraminal stenosis at L3-L4 to  L5-S1.     ADITYA BAE MD      ASSESSMENT & PLAN  Mr. Loo is a 56 year old year old male who presents via telephone today for follow  up regarding chronic low back pain with exacerbation.    MR revealing significant DDD denise L4-L5 and multilevel mild spinal stenosis.    -Treatment options discussed including repeating epidural injections, he wishes to hold off due to temporary nature of improvement as prior  -Continue planned formal PT and to include hip and lumbar programs  -Consider input from neurosurgery team on whether any more permanent intervention does exist for him, if not will proceed with repeat KEVIN likely  -Follow up PRN    It was a pleasure seeing Pranav.    Total Telephone Time: 16 min  Sabino Snider DO, CAQSM  Primary Care Sports Medicine  Department of Orthopedic Surgery  [facility]

## 2022-04-15 NOTE — TELEPHONE ENCOUNTER
Writer routed to Neurosurgery Clinic Scheduling   *Referral Spine     Merry Crooks LPN  Neurosurgery

## 2022-04-15 NOTE — LETTER
4/15/2022       RE: Pranav Loo  [address] [facility] [address]     Dear Colleague,    Thank you for referring your patient, Pranav Loo, to the [facility]. Please see a copy of my visit note below.    ESTABLISHED PATIENT FOLLOW-UP VIRTUAL:  No chief complaint on file.        HISTORY OF PRESENT ILLNESS  Mr. Loo is a pleasant 56 year old year old male who presents via telephone today for follow-up of lumbar MRI results    Date of injury: NA  Date last seen: 3/17/22  Following Therapeutic Plan: Yes, MRI  Pain: Unchanged  Function: Unchanged  Interval History: Increase in pain with more motion.  Continues to experience pain of low back sometimes unpredictably with motion.      Has had previous injections with modest but temporary improvement.  Plan for PT addressing hip issues with Dr. Miller, but feels he cannot adequately perform due to back pain.    Additional medical/Social/Surgical histories reviewed in [facility] and updated as appropriate.    REVIEW OF SYSTEMS (4/15/2022)  CONSTITUTIONAL: Denies fever and weight loss  GASTROINTESTINAL: Denies abdominal pain, nausea, vomiting  MUSCULOSKELETAL: See HPI  SKIN: Denies any recent rash or lesion  NEUROLOGICAL: Denies numbness or focal weakness    IMAGING :   MR LUMBAR SPINE    IMPRESSION:  1.  Moderate to severe L4-L5 degenerative disc disease with multilevel  mild degenerative disc disease elsewhere. This is accompanied by a  small amount of type I Modic endplate degenerative change at L1-L2 and  L4-L5.     2.  Multilevel facet arthropathy is mild to moderate bilaterally at  L2-L3 but more mild elsewhere.     3.  Multilevel mild spinal canal stenosis with no high-grade spinal  canal stenosis at any level.     4.  Multilevel mild bilateral neural foraminal stenosis at L3-L4 to  L5-S1.     ADITYA BAE MD      ASSESSMENT & PLAN  Mr. Loo is a 56  year old year old male who presents via telephone today for follow up regarding chronic low back pain with exacerbation.    MR revealing significant DDD denise L4-L5 and multilevel mild spinal stenosis.    -Treatment options discussed including repeating epidural injections, he wishes to hold off due to temporary nature of improvement as prior  -Continue planned formal PT and to include hip and lumbar programs  -Consider input from neurosurgery team on whether any more permanent intervention does exist for him, if not will proceed with repeat KEVIN likely  -Follow up PRN    It was a pleasure seeing Pranav.    Total Telephone Time: 16 min  Sabino Snider DO, REDDY  Primary Care Sports Medicine  Department of Orthopedic Surgery  [facility]

## 2022-04-15 NOTE — TELEPHONE ENCOUNTER
[facility] Call Center    Phone Message    May a detailed message be left on voicemail: yes     Reason for Call: Appointment Intake    Referring Provider Name: Dr. Snider  Diagnosis and/or Symptoms: Spinal stenosis of lumbar region without neurogenic     Please call Pt back to schedule.    Action Taken: Message routed to:  [facility] ([facility]): [facility]    Travel Screening: Not Applicable

## 2022-04-15 NOTE — LETTER
4/15/2022      RE: Pranav Loo  [address] [facility] [address]       ESTABLISHED PATIENT FOLLOW-UP VIRTUAL:  No chief complaint on file.       This encounter was conducted via telephone in lieu of face-to-face encounter due to precautions implemented during COVID-19 pandemic.     What phone number would you like to be contacted at? [phone]  How would you like to obtain your AVS? MyChart      HISTORY OF PRESENT ILLNESS  Mr. Loo is a pleasant 56 year old year old male who presents via telephone today for follow-up of lumbar MRI results    Date of injury: NA  Date last seen: 3/17/22  Following Therapeutic Plan: Yes, MRI  Pain: Unchanged  Function: Unchanged  Interval History: Increase in pain with more motion.  Continues to experience pain of low back sometimes unpredictably with motion.      Has had previous injections with modest but temporary improvement.  Plan for PT addressing hip issues with Dr. Miller, but feels he cannot adequately perform due to back pain.    Additional medical/Social/Surgical histories reviewed in [facility] and updated as appropriate.    REVIEW OF SYSTEMS (4/15/2022)  CONSTITUTIONAL: Denies fever and weight loss  GASTROINTESTINAL: Denies abdominal pain, nausea, vomiting  MUSCULOSKELETAL: See HPI  SKIN: Denies any recent rash or lesion  NEUROLOGICAL: Denies numbness or focal weakness    IMAGING :   MR LUMBAR SPINE    IMPRESSION:  1.  Moderate to severe L4-L5 degenerative disc disease with multilevel  mild degenerative disc disease elsewhere. This is accompanied by a  small amount of type I Modic endplate degenerative change at L1-L2 and  L4-L5.     2.  Multilevel facet arthropathy is mild to moderate bilaterally at  L2-L3 but more mild elsewhere.     3.  Multilevel mild spinal canal stenosis with no high-grade spinal  canal stenosis at any level.     4.  Multilevel mild bilateral neural foraminal stenosis at L3-L4 to  L5-S1.     ADITYA BAE MD      ASSESSMENT &  PLAN  Mr. Loo is a 56 year old year old male who presents via telephone today for follow up regarding chronic low back pain with exacerbation.    MR revealing significant DDD denise L4-L5 and multilevel mild spinal stenosis.    -Treatment options discussed including repeating epidural injections, he wishes to hold off due to temporary nature of improvement as prior  -Continue planned formal PT and to include hip and lumbar programs  -Consider input from neurosurgery team on whether any more permanent intervention does exist for him, if not will proceed with repeat KEVIN likely  -Follow up PRN    It was a pleasure seeing Pranav.    Total Telephone Time: 16 min  Sabino Snider DO, CAQSM  Primary Care Sports Medicine  Department of Orthopedic Surgery  [facility]

## 2022-04-16 ENCOUNTER — HEALTH MAINTENANCE LETTER (OUTPATIENT)
Age: 57
End: 2022-04-16

## 2022-04-18 NOTE — TELEPHONE ENCOUNTER
Health Call Center    Phone Message    May a detailed message be left on voicemail: yes     Reason for Call: Other: Pt calling to schedule appt with a Neurosurgeon. Pt has a referral to neurosurgery from Dr. Snider. Pt called on Friday (04/15) to schedule and no one told him that someone would be calling him back to schedule. He would like someone to give him a call to schedule asap. Please call pt to schedule appt.  Ph: [phone]    Action Taken: Message routed to:  [facility] ([facility]): Neurosurgery    Travel Screening: Not Applicable

## 2022-04-18 NOTE — TELEPHONE ENCOUNTER
Writer routed to Neurosurgery Clinic Scheduling.   *Referral Spine     Merry Crooks LPN  Neurosurgery

## 2022-05-03 ENCOUNTER — TELEPHONE (OUTPATIENT)
Dept: NEUROSURGERY | Facility: CLINIC | Age: 57
End: 2022-05-03
Payer: COMMERCIAL

## 2022-05-03 NOTE — TELEPHONE ENCOUNTER
Patient had MRI. Referred by Dr. Snider in Sports Med.    Scheduled with Dr. Doyle in neurosurgery.    Krystle Sheppard

## 2022-05-03 NOTE — TELEPHONE ENCOUNTER
Health Call Center    Phone Message    May a detailed message be left on voicemail: yes     Reason for Call: Other: Pranav calling for the third time to request a call back to discuss scheduling options for his referral from Dr. Snider. He has been waiting since 4/15/22 for someone to call him back to discuss scheduling options. Please call Pranav at your earliest convenience to discuss. Marked urgent due to last message being marked urgent.     Action Taken: Message routed to:  [facility] ([facility]): [facility] [place] [place] [facility]UR[facility]GERY    Travel Screening: Not Applicable

## 2022-05-04 ENCOUNTER — TELEPHONE (OUTPATIENT)
Dept: NEUROSURGERY | Facility: CLINIC | Age: 57
End: 2022-05-04
Payer: COMMERCIAL

## 2022-05-04 DIAGNOSIS — M48.061 SPINAL STENOSIS, LUMBAR REGION, WITHOUT NEUROGENIC CLAUDICATION: Primary | ICD-10-CM

## 2022-05-04 NOTE — TELEPHONE ENCOUNTER
SPINE PATIENTS - NEW PROTOCOL PREVISIT    RECORDS RECEIVED FROM: Internal   REASON FOR VISIT: Spinal stenosis of lumbar region without neurogenic claudication   Date of Appt: 5/5/22   NOTES (FOR ALL VISITS) STATUS DETAILS   OFFICE NOTE from referring provider Internal Dr Sabino Snider @ [facility] Sports [facility]:  4/15/22   MEDICATION LIST Internal    IMAGING  (FOR ALL VISITS)     MRI (HEAD, NECK, SPINE) Internal [facility]:  MRI Lumbar SPine 3/28/22   XRAY (SPINE) *NEUROSURGERY* Internal [facility]:  XR Lumbar Spine 3/3/22

## 2022-05-05 ENCOUNTER — PRE VISIT (OUTPATIENT)
Dept: NEUROSURGERY | Facility: CLINIC | Age: 57
End: 2022-05-05

## 2022-05-05 ENCOUNTER — ANCILLARY PROCEDURE (OUTPATIENT)
Dept: GENERAL RADIOLOGY | Facility: CLINIC | Age: 57
End: 2022-05-05
Attending: NEUROLOGICAL SURGERY
Payer: COMMERCIAL

## 2022-05-05 ENCOUNTER — OFFICE VISIT (OUTPATIENT)
Dept: NEUROSURGERY | Facility: CLINIC | Age: 57
End: 2022-05-05
Payer: COMMERCIAL

## 2022-05-05 VITALS
RESPIRATION RATE: 16 BRPM | HEIGHT: 68 IN | DIASTOLIC BLOOD PRESSURE: 106 MMHG | HEART RATE: 75 BPM | OXYGEN SATURATION: 99 % | BODY MASS INDEX: 24.25 KG/M2 | WEIGHT: 160 LBS | SYSTOLIC BLOOD PRESSURE: 194 MMHG

## 2022-05-05 DIAGNOSIS — M53.3 PAIN OF RIGHT SACROILIAC JOINT: Primary | ICD-10-CM

## 2022-05-05 DIAGNOSIS — M40.30 FLATBACK SYNDROME: ICD-10-CM

## 2022-05-05 DIAGNOSIS — M48.061 SPINAL STENOSIS, LUMBAR REGION, WITHOUT NEUROGENIC CLAUDICATION: ICD-10-CM

## 2022-05-05 DIAGNOSIS — M53.3 SACROILIAC JOINT DYSFUNCTION OF BOTH SIDES: Primary | ICD-10-CM

## 2022-05-05 PROCEDURE — 99204 OFFICE O/P NEW MOD 45 MIN: CPT | Performed by: NEUROLOGICAL SURGERY

## 2022-05-05 PROCEDURE — 72082 X-RAY EXAM ENTIRE SPI 2/3 VW: CPT | Performed by: STUDENT IN AN ORGANIZED HEALTH CARE EDUCATION/TRAINING PROGRAM

## 2022-05-05 PROCEDURE — 77073 BONE LENGTH STUDIES: CPT | Performed by: STUDENT IN AN ORGANIZED HEALTH CARE EDUCATION/TRAINING PROGRAM

## 2022-05-05 ASSESSMENT — PAIN SCALES - GENERAL: PAINLEVEL: EXTREME PAIN (8)

## 2022-05-05 NOTE — PROGRESS NOTES
Neurosurgery Clinic Note    Chief Complaint: back and thigh pain    History of Present Illness:  It was a pleasure to evaluate Pranav Loo in clinic today at the kind referral of Sabino Snider, DO  [address] [place] [address].    Pranav Loo is a 56 year old male presenting with low back pain radiating in band across lumbar spine, and into bilateral posterior thighs to above knees, and also pain in lateral thighs and anterior iliac crests bilaterally right worse than left. NO radiating pain to feet. Worst position is sitting. Also uses cane to walk and stand due to pain. Very clear that pain is worst with sitting and with bending/twisting/lifting.     He has prior bilateral hip arthroplasties and Dr. Miller's office note on 3/3/22 ordered imaging and lab studies to evaluate these; Dr. Miller did not feel that there was a sign of infection or reason for further hip intervention, he reviewed the xrays with the patient on 3/3/22 per his note.  Also noted was meralgia paresthetica, abductor tendinitis and abductor pain at the trochanter. Dr. Miller then referred the patient for evaluation of his lumbar spine with Dr. Snider. Dr. Snider saw the patient on 3/17/22, and noted that the patient had prior epidural steroid injections with a prior neurologist per patient report that resulted in transient improvements. Patient feels that back pain limits his ability to participate in PT for both hip and spine.        Past Medical History:   Diagnosis Date     DJD (degenerative joint disease)     left hip     Hypertension        Past Surgical History:   Procedure Laterality Date     ARTHROPLASTY HIP  11/5/2012    Procedure: ARTHROPLASTY HIP;  Right Total Hip Arthroplasty ;  Surgeon: Reagan Miller MD;  Location: [facility]      PELVIS/HIP JOINT SURGERY UNLISTED       CV HEART CATHETERIZATION WITH POSSIBLE INTERVENTION N/A 2/21/2019    Procedure: Heart Catheterization with possible Intervention;  Surgeon:  Karlos Moy MD;  Location:  HEART CARDIAC CATH LAB     EXTRACAPSULAR CATARACT EXTRATION WITH INTRAOCULAR LENS IMPLANT      bilateral     OPTICAL TRACKING SYSTEM RESURFACING HIP  3/30/2011    Procedure:OPTICAL TRACKING SYSTEM RESURFACING HIP; Surgeon:LORENA RITTER; Location: OR       Social History     Socioeconomic History     Marital status:    Tobacco Use     Smoking status: Never Smoker   Substance and Sexual Activity     Alcohol use: Yes     Comment: couple times a week     Drug use: Yes     Types: Marijuana       family history includes Coronary Artery Disease Early Onset (age of onset: 41) in his brother.        IMAGING per my own measurement and interpretation:  Xrays: EOS 05/05/22      MRI 3/28/22 with severe disc degeneration at L4-5, facet arthropathy, Modic changes        Resulted Imaging/Labs:  Bone Density:  XR Pelvis w bilateral hips 1 View    Result Date: 3/3/2022  3 views pelvis radiograph(s) 3/3/2022 12:42 PM History: Left hip pain; Right hip pain Comparison: 12/18/2012 Findings: 3 view(s) of the pelvis were obtained. Redemonstration of bilateral total hip arthroplasties. 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 On the crosstable lateral view there is also lucency surrounding the more inferior screw noted. The components are otherwise well seated. On the left: Total hip arthroplasty with short stem, well seated. Sacrum and innominate bones are partially obscured by overlying bowel gas/fecal content. Pelvic phlebolith.     Impression: 1. 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 2. Stable left total hip arthroplasty. JUTTA ELLERMANN, MD   SYSTEM ID:  [id number]    XR Lumbar Spine 2-3 Views*    Result Date:  3/3/2022  2 views lumbar spine radiographs 3/3/2022 1:12 PM History: Left hip pain; Right hip pain Comparison: 1/2/2014 Findings: Standing  AP and lateral  views of the lumbar spine were obtained. 5  lumbar type vertebral bodies are assumed for the purpose of this dictation. There is no acute osseous abnormality.  There are multilevel degenerative changes of the lumbar spine, most pronounced at L4/L5 . There is also lower lumbar predominant facet arthropathy. Redemonstration of endplate remodeling at the thoracolumbar junction Vascular calcifications. The visualized bowel gas pattern is non-obstructive.     Impression: 1.  No acute osseous abnormality. 2.  Multilevel degenerative changes most pronounced at L4-L5. JUTTA ELLERMANN, MD   SYSTEM ID:  [id number]    MRI Lumbar spine w/o contrast    Result Date: 3/28/2022  MR LUMBAR SPINE WITHOUT CONTRAST 3/28/2022 10:17 AM INDICATION: Low back pain, more than six weeks. Lumbar degenerative disc disease. TECHNIQUE: MRI images of the lumbar spine without contrast. CONTRAST:  None. COMPARISON: 3/3/2022 lumbar spine plain film. FINDINGS: Numbering assumes five lumbar type vertebrae. Straightening of the usual lumbar lordosis with minimal retrolisthesis of L4 on L5 and L5 on S1. Small Schmorl's node at the anterior-inferior endplate of L1. Otherwise maintained vertebral body heights. Type I Modic endplate degenerative change at L1-L2 and L4-L5. No additional osseous edema elsewhere. No identifiable pars defect. The conus tip is identified at L2. Unremarkable paraspinal soft tissues. T12-L1: Mild loss of disc height. Disc desiccation. Small diffuse posterior disc bulge. Mild bilateral facet arthropathy with mild ligamentum flavum hypertrophy. Mild spinal canal stenosis. No significant neural foraminal stenosis.    L1-L2: Mild loss of disc height. Disc desiccation. Small diffuse posterior disc bulge with mild bilateral facet arthropathy and mild ligamentum flavum hypertrophy. Mild  "spinal canal stenosis. No significant neural foraminal stenosis. L2-L3: Minimal loss of disc height. Disc desiccation. Small diffuse posterior disc bulge. Mild to moderate bilateral facet arthropathy with mild ligamentum flavum hypertrophy. Mild spinal canal stenosis with minimal bilateral neural foraminal stenosis. L3-L4: Mild loss of disc height. Disc desiccation. Small diffuse posterior disc bulge with mild bilateral facet arthropathy and mild ligamentum flavum hypertrophy. Mild spinal canal stenosis with mild bilateral neural foraminal stenosis. L4-L5: Moderate to severe loss of disc height. Disc desiccation. Small diffuse posterior disc bulge with mild bilateral facet arthropathy. Mild spinal canal stenosis with mild bilateral neural foraminal stenosis. L5-S1: Mild loss of disc height. Disc desiccation. Small diffuse posterior disc bulge with mild bilateral facet arthropathy. Mild spinal canal stenosis with mild bilateral neural foraminal stenosis.     IMPRESSION: 1.  Moderate to severe L4-L5 degenerative disc disease with multilevel mild degenerative disc disease elsewhere. This is accompanied by a small amount of type I Modic endplate degenerative change at L1-L2 and L4-L5. 2.  Multilevel facet arthropathy is mild to moderate bilaterally at L2-L3 but more mild elsewhere. 3.  Multilevel mild spinal canal stenosis with no high-grade spinal canal stenosis at any level. 4.  Multilevel mild bilateral neural foraminal stenosis at L3-L4 to L5-S1. ADITYA BAE MD   SYSTEM ID:  [id number]      Vitamin D:  Vitamin D Deficiency Screening Results:  No results found for: VITDT  No results found for: AXD142, ABWJ962, LDNP48XPMOL, VITD3, D2VIT, D3VIT, DTOT, [id number], [id number], [id number], [id number], [id number], [id number]      Nutritional Status:  Estimated body mass index is 25.6 kg/m  as calculated from the following:    Height as of 3/3/22: 1.689 m (5' 6.5\").    Weight as of 3/17/22: 73 kg (161 lb).    Lab Results " "  Component Value Date    ALBUMIN 4.5 02/19/2019       Diabetes Screening:  Lab Results   Component Value Date    A1C 6.1 02/19/2019       Nicotine Usage:    No                Physical Exam   BP (!) 194/106   Pulse 75   Resp 16   Ht 1.715 m (5' 7.5\")   Wt 72.6 kg (160 lb)   SpO2 99%   BMI 24.69 kg/m      Constitutional: Oriented to person, place, and time. Appears well-developed and well-nourished. Cooperative. Musculoskeletal:   Cervical flexion-extension range of motion: normal  Lumbar flexion/extension range of motion: pain with 15 degrees of rotation, extension, flexion    Neurological: alert and oriented to person, place, and time.   No cranial nerve deficit   sensory deficit denies  Gait antalgic      Reflex Scores:             Bicep reflexes are 2+ on the right side and 2+ on the left side.       Brachioradialis reflexes are 2+ on the right side and 2+ on the left side.       Patellar reflexes are 2+ on the right side and 2+ on the left side.       Achilles reflexes are 2+ on the right side and 2+ on the left side.    STRENGTH LEFT RIGHT   Deltoid 5 5   Bicep 5 5   Wrist Extensor 5 5   Tricep 5 5   Finger flexion 5 5   Finger abduction 5 5    5 5       Hip Flexion     5     5   Knee Extension 5 5   Ankle Dorsiflexion 5 5   Extensor Hallucis Longus 5 5   Plantar Flexion 5 5   Foot eversion 5 5   Foot inversion 5 5     No ankle clonus        Sacroiliac Joint Exam LEFT RIGHT   Natalio Finger Test + +   PSIS tenderness + +   ThighThrust + +   ROEL + +   Pelvic Gapping - -   Pelvic Compression + +   Gaenslen s + +   Sacral Thrust + +   Hip Exam     SCOUR + +   abduction + +         Skin: Skin is warm, dry and intact.   Psychiatric: Normal mood and affect. Speech is normal and behavior is normal.        ASSESSMENT:  Pranav Loo is a 56 year old male with bilateral sacroiliac joint pain, prior bilateral total hip arthroplasties with ongoing pain, L4-5 lumbar spondylosis with back pain and lower lumbar " hypolordosis/flatback syndrome    PLAN:    I explained to the patient that I think he has symptoms from 3 different body regions: Bilateral sacroiliac joints, lumbar spine, and bilateral hips.      His sacroiliac joints are exquisitely painful on exam today and he reports debilitating symptoms worse when he is sitting, which is consistent with sacroiliac joint pain.  I think his sacroiliac joints have probably become painful over time because of his lower lumbar hypolordosis and he is extending for both his upper lumbar spine and likely through his SI joints to try to compensate for his lower lumbar spondylosis.  I explained that the sacroiliac joint exam is not perfectly sensitive and specific, therefore we need to pursue additional diagnostic measures to diagnose his component of sacroiliac joint pain.  I ordered a diagnostic sacroiliac joint injection with local anesthetic only and no steroids, and I ordered the injection for the right side only.  I carefully instructed the patient to keep a diary of his pain both before and for 24 hours after the injection so we can assess its effect on his symptoms.  I would like to see him back in clinic either in person or with a virtual video visit after the injection to discuss its relief of his pain or if it had no effect.  This will help us understand the next best step in his treatment.  He expressed understanding.  His significant other is a massage therapist and she would like to be present at the next appointment.      Ashlie Doyle MD    [facility] Department of Neurosurgery  Complex Spinal Deformity, Scoliosis, and Minimally Invasive Spine Surgery Specialist  Office: [phone]    5/5/2022            I performed independent visualization of radiographic imaging and entered my own interpretation, reviewed and/or ordered clinical laboratory tests, reviewed and/or ordered tests in radiology, made the decision to obtain old records  and/or history from someone other than the patient and Reviewed and summarized old records and/or discussed this case with another health care provider

## 2022-05-05 NOTE — PROGRESS NOTES
I received a message from the office of Ashlie Doyle MD (neurosurgery).  Dr. Doyle would like us to perform a diagnostic right sacroiliac joint injection with anesthetic only, no steroid.  I called and spoke with . Pranav Loo to find out if he would rather have this performed at the [facility] or in [place].  Mr. Loo stated that he preferred to have this injection performed in [place].  Orders placed for a fluoroscopic guided right sacroiliac joint anesthetic only injection.    Chet Membreno MD

## 2022-05-05 NOTE — LETTER
5/5/2022       RE: Pranav Loo  [address] [facility] [address]     Dear Colleague,    Thank you for referring your patient, Pranav Loo, to the [facility] NEUROSURGERY CLINIC [place] at [facility]. Please see a copy of my visit note below.        Neurosurgery Clinic Note    Chief Complaint: back and thigh pain    History of Present Illness:  It was a pleasure to evaluate Pranav Loo in clinic today at the kind referral of Sabino Snider, DO  [address] [place] [id number].    Pranav Loo is a 56 year old male presenting with low back pain radiating in band across lumbar spine, and into bilateral posterior thighs to above knees, and also pain in lateral thighs and anterior iliac crests bilaterally right worse than left. NO radiating pain to feet. Worst position is sitting. Also uses cane to walk and stand due to pain. Very clear that pain is worst with sitting and with bending/twisting/lifting.     He has prior bilateral hip arthroplasties and Dr. Miller's office note on 3/3/22 ordered imaging and lab studies to evaluate these; Dr. Miller did not feel that there was a sign of infection or reason for further hip intervention, he reviewed the xrays with the patient on 3/3/22 per his note.  Also noted was meralgia paresthetica, abductor tendinitis and abductor pain at the trochanter. Dr. Miller then referred the patient for evaluation of his lumbar spine with Dr. Snider. Dr. Snider saw the patient on 3/17/22, and noted that the patient had prior epidural steroid injections with a prior neurologist per patient report that resulted in transient improvements. Patient feels that back pain limits his ability to participate in PT for both hip and spine.        Past Medical History:   Diagnosis Date     DJD (degenerative joint disease)     left hip     Hypertension        Past Surgical History:   Procedure Laterality Date      ARTHROPLASTY HIP  11/5/2012    Procedure: ARTHROPLASTY HIP;  Right Total Hip Arthroplasty ;  Surgeon: Reagan Ritter MD;  Location:  OR      PELVIS/HIP JOINT SURGERY UNLISTED       CV HEART CATHETERIZATION WITH POSSIBLE INTERVENTION N/A 2/21/2019    Procedure: Heart Catheterization with possible Intervention;  Surgeon: Karlos Moy MD;  Location:  HEART CARDIAC CATH LAB     EXTRACAPSULAR CATARACT EXTRATION WITH INTRAOCULAR LENS IMPLANT      bilateral     OPTICAL TRACKING SYSTEM RESURFACING HIP  3/30/2011    Procedure:OPTICAL TRACKING SYSTEM RESURFACING HIP; Surgeon:REAGAN RITTER; Location: OR       Social History     Socioeconomic History     Marital status:    Tobacco Use     Smoking status: Never Smoker   Substance and Sexual Activity     Alcohol use: Yes     Comment: couple times a week     Drug use: Yes     Types: Marijuana       family history includes Coronary Artery Disease Early Onset (age of onset: 41) in his brother.        IMAGING per my own measurement and interpretation:  Xrays: EOS 05/05/22      MRI 3/28/22 with severe disc degeneration at L4-5, facet arthropathy, Modic changes        Resulted Imaging/Labs:  Bone Density:  XR Pelvis w bilateral hips 1 View    Result Date: 3/3/2022  3 views pelvis radiograph(s) 3/3/2022 12:42 PM History: Left hip pain; Right hip pain Comparison: 12/18/2012 Findings: 3 view(s) of the pelvis were obtained. Redemonstration of bilateral total hip arthroplasties. 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 On the crosstable lateral view there is also lucency surrounding the more inferior screw noted. The components are otherwise well seated. On the left: Total hip arthroplasty with short stem, well seated. Sacrum and innominate bones are partially obscured by overlying bowel gas/fecal content. Pelvic phlebolith.     Impression: 1. 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 2. Stable left total hip arthroplasty. JUTTA ELLERMANN, MD   SYSTEM ID:  [id number]    XR Lumbar Spine 2-3 Views*    Result Date: 3/3/2022  2 views lumbar spine radiographs 3/3/2022 1:12 PM History: Left hip pain; Right hip pain Comparison: 1/2/2014 Findings: Standing  AP and lateral  views of the lumbar spine were obtained. 5  lumbar type vertebral bodies are assumed for the purpose of this dictation. There is no acute osseous abnormality.  There are multilevel degenerative changes of the lumbar spine, most pronounced at L4/L5 . There is also lower lumbar predominant facet arthropathy. Redemonstration of endplate remodeling at the thoracolumbar junction Vascular calcifications. The visualized bowel gas pattern is non-obstructive.     Impression: 1.  No acute osseous abnormality. 2.  Multilevel degenerative changes most pronounced at L4-L5. JUTTA ELLERMANN, MD   SYSTEM ID:  [id number]    MRI Lumbar spine w/o contrast    Result Date: 3/28/2022  MR LUMBAR SPINE WITHOUT CONTRAST 3/28/2022 10:17 AM INDICATION: Low back pain, more than six weeks. Lumbar degenerative disc disease. TECHNIQUE: MRI images of the lumbar spine without contrast. CONTRAST:  None. COMPARISON: 3/3/2022 lumbar spine plain film. FINDINGS: Numbering assumes five lumbar type vertebrae. Straightening of the usual lumbar lordosis with minimal retrolisthesis of L4 on L5 and L5 on S1. Small Schmorl's node at the anterior-inferior endplate of L1. Otherwise maintained vertebral body heights. Type I Modic endplate degenerative change at L1-L2 and L4-L5. No additional osseous edema elsewhere. No identifiable pars defect. The conus tip is identified at L2. Unremarkable paraspinal soft tissues. T12-L1: Mild loss of disc height. Disc desiccation. Small diffuse  posterior disc bulge. Mild bilateral facet arthropathy with mild ligamentum flavum hypertrophy. Mild spinal canal stenosis. No significant neural foraminal stenosis.    L1-L2: Mild loss of disc height. Disc desiccation. Small diffuse posterior disc bulge with mild bilateral facet arthropathy and mild ligamentum flavum hypertrophy. Mild spinal canal stenosis. No significant neural foraminal stenosis. L2-L3: Minimal loss of disc height. Disc desiccation. Small diffuse posterior disc bulge. Mild to moderate bilateral facet arthropathy with mild ligamentum flavum hypertrophy. Mild spinal canal stenosis with minimal bilateral neural foraminal stenosis. L3-L4: Mild loss of disc height. Disc desiccation. Small diffuse posterior disc bulge with mild bilateral facet arthropathy and mild ligamentum flavum hypertrophy. Mild spinal canal stenosis with mild bilateral neural foraminal stenosis. L4-L5: Moderate to severe loss of disc height. Disc desiccation. Small diffuse posterior disc bulge with mild bilateral facet arthropathy. Mild spinal canal stenosis with mild bilateral neural foraminal stenosis. L5-S1: Mild loss of disc height. Disc desiccation. Small diffuse posterior disc bulge with mild bilateral facet arthropathy. Mild spinal canal stenosis with mild bilateral neural foraminal stenosis.     IMPRESSION: 1.  Moderate to severe L4-L5 degenerative disc disease with multilevel mild degenerative disc disease elsewhere. This is accompanied by a small amount of type I Modic endplate degenerative change at L1-L2 and L4-L5. 2.  Multilevel facet arthropathy is mild to moderate bilaterally at L2-L3 but more mild elsewhere. 3.  Multilevel mild spinal canal stenosis with no high-grade spinal canal stenosis at any level. 4.  Multilevel mild bilateral neural foraminal stenosis at L3-L4 to L5-S1. ADITYA BAE MD   SYSTEM ID:  [id number]      Vitamin D:  Vitamin D Deficiency Screening Results:  No results found for: VITDT  No results  "found for: XQZ259, MXCP267, [id number], VITD3, D2VIT, D3VIT, DTOT, [id number], [id number], [id number], [id number], [id number], [id number]      Nutritional Status:  Estimated body mass index is 25.6 kg/m  as calculated from the following:    Height as of 3/3/22: 1.689 m (5' 6.5\").    Weight as of 3/17/22: 73 kg (161 lb).    Lab Results   Component Value Date    ALBUMIN 4.5 02/19/2019       Diabetes Screening:  Lab Results   Component Value Date    A1C 6.1 02/19/2019       Nicotine Usage:    No                Physical Exam   BP (!) 194/106   Pulse 75   Resp 16   Ht 1.715 m (5' 7.5\")   Wt 72.6 kg (160 lb)   SpO2 99%   BMI 24.69 kg/m      Constitutional: Oriented to person, place, and time. Appears well-developed and well-nourished. Cooperative. Musculoskeletal:   Cervical flexion-extension range of motion: normal  Lumbar flexion/extension range of motion: pain with 15 degrees of rotation, extension, flexion    Neurological: alert and oriented to person, place, and time.   No cranial nerve deficit   sensory deficit denies  Gait antalgic      Reflex Scores:             Bicep reflexes are 2+ on the right side and 2+ on the left side.       Brachioradialis reflexes are 2+ on the right side and 2+ on the left side.       Patellar reflexes are 2+ on the right side and 2+ on the left side.       Achilles reflexes are 2+ on the right side and 2+ on the left side.    STRENGTH LEFT RIGHT   Deltoid 5 5   Bicep 5 5   Wrist Extensor 5 5   Tricep 5 5   Finger flexion 5 5   Finger abduction 5 5    5 5       Hip Flexion     5     5   Knee Extension 5 5   Ankle Dorsiflexion 5 5   Extensor Hallucis Longus 5 5   Plantar Flexion 5 5   Foot eversion 5 5   Foot inversion 5 5     No ankle clonus        Sacroiliac Joint Exam LEFT RIGHT   Natalio Finger Test + +   PSIS tenderness + +   ThighThrust + +   ROEL + +   Pelvic Gapping - -   Pelvic Compression + +   Gaenslen s + +   Sacral Thrust + +   Hip Exam     SCOUR + +   abduction + " +         Skin: Skin is warm, dry and intact.   Psychiatric: Normal mood and affect. Speech is normal and behavior is normal.        ASSESSMENT:  Pranav Loo is a 56 year old male with bilateral sacroiliac joint pain, prior bilateral total hip arthroplasties with ongoing pain, L4-5 lumbar spondylosis with back pain and lower lumbar hypolordosis/flatback syndrome    PLAN:    I explained to the patient that I think he has symptoms from 3 different body regions: Bilateral sacroiliac joints, lumbar spine, and bilateral hips.      His sacroiliac joints are exquisitely painful on exam today and he reports debilitating symptoms worse when he is sitting, which is consistent with sacroiliac joint pain.  I think his sacroiliac joints have probably become painful over time because of his lower lumbar hypolordosis and he is extending for both his upper lumbar spine and likely through his SI joints to try to compensate for his lower lumbar spondylosis.  I explained that the sacroiliac joint exam is not perfectly sensitive and specific, therefore we need to pursue additional diagnostic measures to diagnose his component of sacroiliac joint pain.  I ordered a diagnostic sacroiliac joint injection with local anesthetic only and no steroids, and I ordered the injection for the right side only.  I carefully instructed the patient to keep a diary of his pain both before and for 24 hours after the injection so we can assess its effect on his symptoms.  I would like to see him back in clinic either in person or with a virtual video visit after the injection to discuss its relief of his pain or if it had no effect.  This will help us understand the next best step in his treatment.  He expressed understanding.  His significant other is a massage therapist and she would like to be present at the next appointment.      Ashlie Doyle MD    [facility] Department of Neurosurgery  Complex Spinal Deformity,  Scoliosis, and Minimally Invasive Spine Surgery Specialist  Office: [phone]    5/5/2022        I performed independent visualization of radiographic imaging and entered my own interpretation, reviewed and/or ordered clinical laboratory tests, reviewed and/or ordered tests in radiology, made the decision to obtain old records and/or history from someone other than the patient and Reviewed and summarized old records and/or discussed this case with another health care provider

## 2022-05-05 NOTE — PATIENT INSTRUCTIONS
Dr. Doyle ordered a right sacroiliac joint injection with local anesthetic only, no steroids.  The [facility] and pain center will call you to schedule this if you were not scheduled yet today after clinic.  If you have not heard from them within 48 hours, please call [phone] and asked to speak to Dr. Doyle's nurse John Alonso who can help you coordinate this.  Please make an appointment to see Dr. Doyle either by video or in person after the injection is done to discuss the results and next steps

## 2022-05-06 ENCOUNTER — TELEPHONE (OUTPATIENT)
Dept: PHYSICAL MEDICINE AND REHAB | Facility: CLINIC | Age: 57
End: 2022-05-06
Payer: COMMERCIAL

## 2022-05-06 NOTE — TELEPHONE ENCOUNTER
----- Message from Krystle Sheppard sent at 5/6/2022  7:33 AM CDT -----  Regarding: FW: PM&R Referral    ----- Message -----  From: Gaby Lr  Sent: 5/5/2022  10:32 AM CDT  To: [facility] Nurses-  Subject: PM&R Referral                                    Hi,    Please schedule pt referral by Dr. Doyle.

## 2022-05-12 ENCOUNTER — TELEPHONE (OUTPATIENT)
Dept: GENERAL RADIOLOGY | Facility: CLINIC | Age: 57
End: 2022-05-12
Payer: COMMERCIAL

## 2022-05-19 ENCOUNTER — HOSPITAL ENCOUNTER (OUTPATIENT)
Dept: GENERAL RADIOLOGY | Facility: CLINIC | Age: 57
Discharge: HOME | End: 2022-05-19
Attending: PHYSICAL MEDICINE & REHABILITATION | Admitting: PHYSICAL MEDICINE & REHABILITATION
Payer: COMMERCIAL

## 2022-05-19 VITALS
HEART RATE: 72 BPM | RESPIRATION RATE: 16 BRPM | DIASTOLIC BLOOD PRESSURE: 103 MMHG | SYSTOLIC BLOOD PRESSURE: 180 MMHG | OXYGEN SATURATION: 99 %

## 2022-05-19 DIAGNOSIS — M53.3 PAIN OF RIGHT SACROILIAC JOINT: Primary | ICD-10-CM

## 2022-05-19 PROCEDURE — 255N000002 HC RX 255 OP 636: Performed by: PHYSICAL MEDICINE & REHABILITATION

## 2022-05-19 PROCEDURE — 250N000009 HC RX 250

## 2022-05-19 PROCEDURE — 27096 INJECT SACROILIAC JOINT: CPT | Mod: RT

## 2022-05-19 PROCEDURE — 27096 INJECT SACROILIAC JOINT: CPT | Mod: RT | Performed by: PHYSICAL MEDICINE & REHABILITATION

## 2022-05-19 PROCEDURE — 250N000011 HC RX IP 250 OP 636

## 2022-05-19 RX ORDER — IOPAMIDOL 408 MG/ML
10 INJECTION, SOLUTION INTRATHECAL ONCE
Status: COMPLETED | OUTPATIENT
Start: 2022-05-19 | End: 2022-05-19

## 2022-05-19 RX ORDER — ROPIVACAINE HYDROCHLORIDE 5 MG/ML
1.5 INJECTION, SOLUTION EPIDURAL; INFILTRATION; PERINEURAL ONCE
Status: COMPLETED | OUTPATIENT
Start: 2022-05-19 | End: 2022-05-19

## 2022-05-19 RX ORDER — LIDOCAINE HYDROCHLORIDE 10 MG/ML
5 INJECTION, SOLUTION EPIDURAL; INFILTRATION; INTRACAUDAL; PERINEURAL ONCE
Status: COMPLETED | OUTPATIENT
Start: 2022-05-19 | End: 2022-05-19

## 2022-05-19 RX ORDER — ROPIVACAINE HYDROCHLORIDE 5 MG/ML
INJECTION, SOLUTION EPIDURAL; INFILTRATION; PERINEURAL
Status: COMPLETED
Start: 2022-05-19 | End: 2022-05-19

## 2022-05-19 RX ORDER — LIDOCAINE HYDROCHLORIDE 10 MG/ML
INJECTION, SOLUTION EPIDURAL; INFILTRATION; INTRACAUDAL; PERINEURAL
Status: COMPLETED
Start: 2022-05-19 | End: 2022-05-19

## 2022-05-19 RX ADMIN — LIDOCAINE HYDROCHLORIDE 2.5 ML: 10 INJECTION, SOLUTION EPIDURAL; INFILTRATION; INTRACAUDAL; PERINEURAL at 08:45

## 2022-05-19 RX ADMIN — ROPIVACAINE HYDROCHLORIDE 1.5 ML: 5 INJECTION, SOLUTION EPIDURAL; INFILTRATION; PERINEURAL at 08:53

## 2022-05-19 RX ADMIN — IOPAMIDOL 0.6 ML: 408 INJECTION, SOLUTION INTRATHECAL at 08:49

## 2022-05-19 NOTE — PROGRESS NOTES
Assisted MD Stephenson in right sided SI joint injection. Medications given per MAR. Pain prior to procedure 7/10. Pain post procedure 5/10. Pt tolerated procedure well. Injection site covered with dressing. Dressing clean, dry and intact at time of discharge. Discharge instructions given and all questions answered. Pt left ambulatory in stable condition.

## 2022-06-08 ENCOUNTER — TELEPHONE (OUTPATIENT)
Dept: PHYSICAL MEDICINE AND REHAB | Facility: CLINIC | Age: 57
End: 2022-06-08
Payer: COMMERCIAL

## 2022-06-08 NOTE — TELEPHONE ENCOUNTER
Writer left voice message to remind him of his appointment with Dr. Steinberg tomorrow regarding hip pain. I relayed that he had seen a few other doctors for his hip pain recently and that if he believes he still needs this appointment he should come or that if he needs to cancel he can do so.  Karely Kraft

## 2022-06-09 ENCOUNTER — VIRTUAL VISIT (OUTPATIENT)
Dept: NEUROSURGERY | Facility: CLINIC | Age: 57
End: 2022-06-09

## 2022-06-09 DIAGNOSIS — M53.3 SACROILIAC JOINT DYSFUNCTION OF BOTH SIDES: Primary | ICD-10-CM

## 2022-06-09 PROCEDURE — 99214 OFFICE O/P EST MOD 30 MIN: CPT | Mod: 95 | Performed by: NEUROLOGICAL SURGERY

## 2022-06-09 NOTE — PROGRESS NOTES
"Pranav is a 56 year old who is being evaluated via a billable video visit.      How would you like to obtain your AVS? MyChart  If the video visit is dropped, the invitation should be resent by: Text to cell phone: [phone]  Will anyone else be joining your video visit? No        Pranav Loo is a 56 year old male who is being evaluated via a billable video visit.       The patient has been notified of following:      \"This video visit will be conducted via a call between you and your physician/provider. We have found that certain health care needs can be provided without the need for an in-person physical exam.  This service lets us provide the care you need with a video conversation.  If a prescription is necessary we can send it directly to your pharmacy.  If lab work is needed we can place an order for that and you can then stop by our lab to have the test done at a later time.     Video visits are billed at different rates depending on your insurance coverage.  Please reach out to your insurance provider with any questions.     If during the course of the call the physician/provider feels a video visit is not appropriate, you will not be charged for this service.\"       Type of service:  Video Visit    Video Start Time: 8:15 AM  Video End Time: 8:27 AM    Originating Location (pt. Location): Home    Distant Location (provider location):  [facility] NEUROSURGERY     Platform used for Video Visit: [facility]        Neurosurgery Clinic Note     Chief Complaint: back and thigh pain     History of Present Illness:  It was a pleasure to evaluate Pranav Loo in clinic today at the kind referral of Sbaino Snider, [address] [address].     Pranav Loo is a 56 year old male presenting with low back pain radiating in band across lumbar spine, and into bilateral posterior thighs to above knees, and also pain in lateral thighs and anterior iliac crests bilaterally right worse than left. " "NO radiating pain to feet. Worst position is sitting. Also uses cane to walk and stand due to pain. Very clear that pain is worst with sitting and with bending/twisting/lifting.     He has prior bilateral hip arthroplasties and Dr. Miller's office note on 3/3/22 ordered imaging and lab studies to evaluate these; Dr. Miller did not feel that there was a sign of infection or reason for further hip intervention, he reviewed the xrays with the patient on 3/3/22 per his note.  Also noted was meralgia paresthetica, abductor tendinitis and abductor pain at the trochanter. Dr. Miller then referred the patient for evaluation of his lumbar spine with Dr. Snider. Dr. Snider saw the patient on 3/17/22, and noted that the patient had prior epidural steroid injections with a prior neurologist per patient report that resulted in transient improvements. Patient feels that back pain limits his ability to participate in PT for both hip and spine.     On initial evaluation in my clinic, patient had bilateral positive sacroiliac joint exam findings. He underwent a right diagnostic SI joint injection on 5/19/22 with local anesthetic only no steroids.     In pain journal, patient recorded that initial pain relief was 80% and over several days after injection he had resolution of \"gripping sharp pain\" that still has stayed resolved, he has had positive effect from that injection over 20 days since that injection and is still about 60% improved.    He states that he has redness and itchiness across back since injection- he will ask his PCP to evaluate.      His left-sided symptoms are now most prominent and painful since his right side is better     Past Medical History        Past Medical History:   Diagnosis Date     DJD (degenerative joint disease)       left hip     Hypertension              Past Surgical History         Past Surgical History:   Procedure Laterality Date     ARTHROPLASTY HIP   11/5/2012     Procedure: ARTHROPLASTY HIP;  " Right Total Hip Arthroplasty ;  Surgeon: Reagan Ritter MD;  Location:  OR      PELVIS/HIP JOINT SURGERY UNLISTED         CV HEART CATHETERIZATION WITH POSSIBLE INTERVENTION N/A 2/21/2019     Procedure: Heart Catheterization with possible Intervention;  Surgeon: Karlos Moy MD;  Location:  HEART CARDIAC CATH LAB     EXTRACAPSULAR CATARACT EXTRATION WITH INTRAOCULAR LENS IMPLANT         bilateral     OPTICAL TRACKING SYSTEM RESURFACING HIP   3/30/2011     Procedure:OPTICAL TRACKING SYSTEM RESURFACING HIP; Surgeon:REAGAN RITTER; Location: OR            Social History            Socioeconomic History     Marital status:    Tobacco Use     Smoking status: Never Smoker   Substance and Sexual Activity     Alcohol use: Yes       Comment: couple times a week     Drug use: Yes       Types: Marijuana         family history includes Coronary Artery Disease Early Onset (age of onset: 41) in his brother.         IMAGING per my own measurement and interpretation:  Xrays: EOS 05/05/22  Lower lumbar hypolordosis- L4-S1 actual 27 expected 35       MRI 3/28/22 with loss of disc height and advanced disc degeneration at L4-5, facet arthropathy, Modic changes. No central canal or foraminal stenosis               Resulted Imaging/Labs:  Bone Density:  XR Pelvis w bilateral hips 1 View     Result Date: 3/3/2022  3 views pelvis radiograph(s) 3/3/2022 12:42 PM History: Left hip pain; Right hip pain Comparison: 12/18/2012 Findings: 3 view(s) of the pelvis were obtained. Redemonstration of bilateral total hip arthroplasties. 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 On the crosstable lateral view there is also lucency surrounding the more inferior screw noted. The components are otherwise well seated. On the left: Total hip arthroplasty with short stem, well seated. Sacrum and innominate bones are partially obscured by overlying bowel  gas/fecal content. Pelvic phlebolith.      Impression: 1. 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 2. Stable left total hip arthroplasty. JUTTA ELLERMANN, MD   SYSTEM ID:  [id number]     XR Lumbar Spine 2-3 Views*     Result Date: 3/3/2022  2 views lumbar spine radiographs 3/3/2022 1:12 PM History: Left hip pain; Right hip pain Comparison: 1/2/2014 Findings: Standing  AP and lateral  views of the lumbar spine were obtained. 5  lumbar type vertebral bodies are assumed for the purpose of this dictation. There is no acute osseous abnormality.  There are multilevel degenerative changes of the lumbar spine, most pronounced at L4/L5 . There is also lower lumbar predominant facet arthropathy. Redemonstration of endplate remodeling at the thoracolumbar junction Vascular calcifications. The visualized bowel gas pattern is non-obstructive.      Impression: 1.  No acute osseous abnormality. 2.  Multilevel degenerative changes most pronounced at L4-L5. JUTTA ELLERMANN, MD   SYSTEM ID:  [id number]     MRI Lumbar spine w/o contrast     Result Date: 3/28/2022  MR LUMBAR SPINE WITHOUT CONTRAST 3/28/2022 10:17 AM INDICATION: Low back pain, more than six weeks. Lumbar degenerative disc disease. TECHNIQUE: MRI images of the lumbar spine without contrast. CONTRAST:  None. COMPARISON: 3/3/2022 lumbar spine plain film. FINDINGS: Numbering assumes five lumbar type vertebrae. Straightening of the usual lumbar lordosis with minimal retrolisthesis of L4 on L5 and L5 on S1. Small Schmorl's node at the anterior-inferior endplate of L1. Otherwise maintained vertebral body heights. Type I Modic endplate degenerative change at L1-L2 and L4-L5. No additional osseous edema elsewhere. No identifiable pars defect. The conus tip is identified at L2.  Unremarkable paraspinal soft tissues. T12-L1: Mild loss of disc height. Disc desiccation. Small diffuse posterior disc bulge. Mild bilateral facet arthropathy with mild ligamentum flavum hypertrophy. Mild spinal canal stenosis. No significant neural foraminal stenosis.    L1-L2: Mild loss of disc height. Disc desiccation. Small diffuse posterior disc bulge with mild bilateral facet arthropathy and mild ligamentum flavum hypertrophy. Mild spinal canal stenosis. No significant neural foraminal stenosis. L2-L3: Minimal loss of disc height. Disc desiccation. Small diffuse posterior disc bulge. Mild to moderate bilateral facet arthropathy with mild ligamentum flavum hypertrophy. Mild spinal canal stenosis with minimal bilateral neural foraminal stenosis. L3-L4: Mild loss of disc height. Disc desiccation. Small diffuse posterior disc bulge with mild bilateral facet arthropathy and mild ligamentum flavum hypertrophy. Mild spinal canal stenosis with mild bilateral neural foraminal stenosis. L4-L5: Moderate to severe loss of disc height. Disc desiccation. Small diffuse posterior disc bulge with mild bilateral facet arthropathy. Mild spinal canal stenosis with mild bilateral neural foraminal stenosis. L5-S1: Mild loss of disc height. Disc desiccation. Small diffuse posterior disc bulge with mild bilateral facet arthropathy. Mild spinal canal stenosis with mild bilateral neural foraminal stenosis.      IMPRESSION: 1.  Moderate to severe L4-L5 degenerative disc disease with multilevel mild degenerative disc disease elsewhere. This is accompanied by a small amount of type I Modic endplate degenerative change at L1-L2 and L4-L5. 2.  Multilevel facet arthropathy is mild to moderate bilaterally at L2-L3 but more mild elsewhere. 3.  Multilevel mild spinal canal stenosis with no high-grade spinal canal stenosis at any level. 4.  Multilevel mild bilateral neural foraminal stenosis at L3-L4 to L5-S1. ADITYA BAE MD   SYSTEM ID:   "[id number]        Vitamin D:  Vitamin D Deficiency Screening Results:  No results found for: VITDT  No results found for: LIH003, QIWS029, [id number], VITD3, D2VIT, D3VIT, DTOT, [id number], [id number], [id number], [id number], [id number], [id number]        Nutritional Status:  Estimated body mass index is 25.6 kg/m  as calculated from the following:    Height as of 3/3/22: 1.689 m (5' 6.5\").    Weight as of 3/17/22: 73 kg (161 lb).           Lab Results   Component Value Date     ALBUMIN 4.5 02/19/2019         Diabetes Screening:        Lab Results   Component Value Date     A1C 6.1 02/19/2019         Nicotine Usage:     No                     Physical Exam- historical from 5/5/22  BP (!) 194/106   Pulse 75   Resp 16   Ht 1.715 m (5' 7.5\")   Wt 72.6 kg (160 lb)   SpO2 99%   BMI 24.69 kg/m       Constitutional: Oriented to person, place, and time. Appears well-developed and well-nourished. Cooperative. Musculoskeletal:   Cervical flexion-extension range of motion: normal  Lumbar flexion/extension range of motion: pain with 15 degrees of rotation, extension, flexion    Neurological: alert and oriented to person, place, and time.   No cranial nerve deficit   sensory deficit denies  Gait antalgic        Reflex Scores:             Bicep reflexes are 2+ on the right side and 2+ on the left side.       Brachioradialis reflexes are 2+ on the right side and 2+ on the left side.       Patellar reflexes are 2+ on the right side and 2+ on the left side.       Achilles reflexes are 2+ on the right side and 2+ on the left side.     STRENGTH LEFT RIGHT   Deltoid 5 5   Bicep 5 5   Wrist Extensor 5 5   Tricep 5 5   Finger flexion 5 5   Finger abduction 5 5    5 5         Hip Flexion       5       5   Knee Extension 5 5   Ankle Dorsiflexion 5 5   Extensor Hallucis Longus 5 5   Plantar Flexion 5 5   Foot eversion 5 5   Foot inversion 5 5     No ankle clonus         Sacroiliac Joint Exam LEFT RIGHT   Natalio Finger Test + + " "  PSIS tenderness + +   ThighThrust + +   ROEL + +   Pelvic Gapping - -   Pelvic Compression + +   Gaenslen s + +   Sacral Thrust + +   Hip Exam       SCOUR + +   abduction + +          Skin: Skin is warm, dry and intact.   Psychiatric: Normal mood and affect. Speech is normal and behavior is normal.           ASSESSMENT:  Pranav Loo is a 56 year old male with bilateral sacroiliac joint pain, prior bilateral total hip arthroplasties with ongoing pain, L4-5 lumbar spondylosis with back pain and lower lumbar hypolordosis/flatback syndrome     PLAN:     I explained to the patient that I think he has symptoms from 3 different body regions: Bilateral sacroiliac joints, lumbar spine, and bilateral hips.      His sacroiliac joints are exquisitely painful on exam and he reports debilitating symptoms worse when he is sitting, which is consistent with sacroiliac joint pain.  I think his sacroiliac joints have probably become painful over time because of his lower lumbar hypolordosis and he is extending for both his upper lumbar spine and likely through his SI joints to try to compensate for his lower lumbar spondylosis.    Diagnostic right sacroiliac joint injection with local anesthetic only no steroids on 5/19/22 gave him about 80% relief initially for one week and then now is 60% relief that has lasted over 20 days. His hip even feels better on the right side    We will order a left diagnostic SI joint injection to evaluate symptom relief on the left side.    Patient instructed:  \"Please call [phone] to schedule the injection that Dr. Doyle ordered of your left sacroiliac joint.  Please see your PCP about your rash across your back- this will need to be resolved prior to any additional injections. Please remind the person performing your injection that you have an allergy to tape adhesive.    If you have lasting relief, you do not need to make an appointment to see Dr. Doyle. You can call [phone] to " "schedule an appointment to see Dr. Doyle in clinic if you have worsening of your pain.\"      Ashlie Doyle MD    [facility] Department of Neurosurgery  Office: [phone]    6/9/2022  8:31 AM          "

## 2022-06-09 NOTE — PATIENT INSTRUCTIONS
Please call [phone] to schedule the injection that Dr. Doyle ordered of your left sacroiliac joint.  Please see your PCP about your rash across your back- this will need to be resolved prior to any additional injections. Please remind the person performing your injection that you have an allergy to tape adhesive.    If you have lasting relief, you do not need to make an appointment to see Dr. Doyle. You can call [phone] to schedule an appointment to see Dr. Doyle in clinic if you have worsening of your pain.

## 2022-06-09 NOTE — LETTER
6/9/2022       RE: Pranav Loo  [address] [facility] [address]     Dear Colleague,    Thank you for referring your patient, Pranav Loo, to the [facility] NEUROSURGERY CLINIC [place] [facility]. Please see a copy of my visit note below.      Neurosurgery Clinic Note     Chief Complaint: back and thigh pain     History of Present Illness:  It was a pleasure to evaluate Pranav Loo in clinic today at the kind referral of Sabino Snider, DO  [address] [place] [address].     Pranav Loo is a 56 year old male presenting with low back pain radiating in band across lumbar spine, and into bilateral posterior thighs to above knees, and also pain in lateral thighs and anterior iliac crests bilaterally right worse than left. NO radiating pain to feet. Worst position is sitting. Also uses cane to walk and stand due to pain. Very clear that pain is worst with sitting and with bending/twisting/lifting.     He has prior bilateral hip arthroplasties and Dr. Miller's office note on 3/3/22 ordered imaging and lab studies to evaluate these; Dr. Miller did not feel that there was a sign of infection or reason for further hip intervention, he reviewed the xrays with the patient on 3/3/22 per his note.  Also noted was meralgia paresthetica, abductor tendinitis and abductor pain at the trochanter. Dr. Miller then referred the patient for evaluation of his lumbar spine with Dr. Snider. Dr. Snider saw the patient on 3/17/22, and noted that the patient had prior epidural steroid injections with a prior neurologist per patient report that resulted in transient improvements. Patient feels that back pain limits his ability to participate in PT for both hip and spine.     On initial evaluation in my clinic, patient had bilateral positive sacroiliac joint exam findings. He underwent a right diagnostic SI joint injection on 5/19/22 with local  "anesthetic only no steroids.     In pain journal, patient recorded that initial pain relief was 80% and over several days after injection he had resolution of \"gripping sharp pain\" that still has stayed resolved, he has had positive effect from that injection over 20 days since that injection and is still about 60% improved.    He states that he has redness and itchiness across back since injection- he will ask his PCP to evaluate.      His left-sided symptoms are now most prominent and painful since his right side is better     Past Medical History        Past Medical History:   Diagnosis Date     DJD (degenerative joint disease)       left hip     Hypertension              Past Surgical History         Past Surgical History:   Procedure Laterality Date     ARTHROPLASTY HIP   11/5/2012     Procedure: ARTHROPLASTY HIP;  Right Total Hip Arthroplasty ;  Surgeon: Regaan Ritter MD;  Location:  OR      PELVIS/HIP JOINT SURGERY UNLISTED         CV HEART CATHETERIZATION WITH POSSIBLE INTERVENTION N/A 2/21/2019     Procedure: Heart Catheterization with possible Intervention;  Surgeon: Karlos Moy MD;  Location:  HEART CARDIAC CATH LAB     EXTRACAPSULAR CATARACT EXTRATION WITH INTRAOCULAR LENS IMPLANT         bilateral     OPTICAL TRACKING SYSTEM RESURFACING HIP   3/30/2011     Procedure:OPTICAL TRACKING SYSTEM RESURFACING HIP; Surgeon:REAGAN RITTER; Location: OR            Social History            Socioeconomic History     Marital status:    Tobacco Use     Smoking status: Never Smoker   Substance and Sexual Activity     Alcohol use: Yes       Comment: couple times a week     Drug use: Yes       Types: Marijuana         family history includes Coronary Artery Disease Early Onset (age of onset: 41) in his brother.         IMAGING per my own measurement and interpretation:  Xrays: EOS 05/05/22  Lower lumbar hypolordosis- L4-S1 actual 27 expected 35       MRI 3/28/22 with loss of disc height and " advanced disc degeneration at L4-5, facet arthropathy, Modic changes. No central canal or foraminal stenosis               Resulted Imaging/Labs:  Bone Density:  XR Pelvis w bilateral hips 1 View     Result Date: 3/3/2022  3 views pelvis radiograph(s) 3/3/2022 12:42 PM History: Left hip pain; Right hip pain Comparison: 12/18/2012 Findings: 3 view(s) of the pelvis were obtained. Redemonstration of bilateral total hip arthroplasties. 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 On the crosstable lateral view there is also lucency surrounding the more inferior screw noted. The components are otherwise well seated. On the left: Total hip arthroplasty with short stem, well seated. Sacrum and innominate bones are partially obscured by overlying bowel gas/fecal content. Pelvic phlebolith.      Impression: 1. 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 2. Stable left total hip arthroplasty. JUTTA ELLERMANN, MD   SYSTEM ID:  [id number]     XR Lumbar Spine 2-3 Views*     Result Date: 3/3/2022  2 views lumbar spine radiographs 3/3/2022 1:12 PM History: Left hip pain; Right hip pain Comparison: 1/2/2014 Findings: Standing  AP and lateral  views of the lumbar spine were obtained. 5  lumbar type vertebral bodies are assumed for the purpose of this dictation. There is no acute osseous abnormality.  There are multilevel degenerative changes of the lumbar spine, most pronounced at L4/L5 . There is also lower lumbar predominant facet arthropathy. Redemonstration of endplate remodeling at the thoracolumbar junction Vascular calcifications. The visualized bowel gas pattern is non-obstructive.      Impression: 1.  No acute osseous abnormality. 2.  Multilevel degenerative  changes most pronounced at L4-L5. JUTTA ELLERMANN, MD   SYSTEM ID:  [id number]     MRI Lumbar spine w/o contrast     Result Date: 3/28/2022  MR LUMBAR SPINE WITHOUT CONTRAST 3/28/2022 10:17 AM INDICATION: Low back pain, more than six weeks. Lumbar degenerative disc disease. TECHNIQUE: MRI images of the lumbar spine without contrast. CONTRAST:  None. COMPARISON: 3/3/2022 lumbar spine plain film. FINDINGS: Numbering assumes five lumbar type vertebrae. Straightening of the usual lumbar lordosis with minimal retrolisthesis of L4 on L5 and L5 on S1. Small Schmorl's node at the anterior-inferior endplate of L1. Otherwise maintained vertebral body heights. Type I Modic endplate degenerative change at L1-L2 and L4-L5. No additional osseous edema elsewhere. No identifiable pars defect. The conus tip is identified at L2. Unremarkable paraspinal soft tissues. T12-L1: Mild loss of disc height. Disc desiccation. Small diffuse posterior disc bulge. Mild bilateral facet arthropathy with mild ligamentum flavum hypertrophy. Mild spinal canal stenosis. No significant neural foraminal stenosis.    L1-L2: Mild loss of disc height. Disc desiccation. Small diffuse posterior disc bulge with mild bilateral facet arthropathy and mild ligamentum flavum hypertrophy. Mild spinal canal stenosis. No significant neural foraminal stenosis. L2-L3: Minimal loss of disc height. Disc desiccation. Small diffuse posterior disc bulge. Mild to moderate bilateral facet arthropathy with mild ligamentum flavum hypertrophy. Mild spinal canal stenosis with minimal bilateral neural foraminal stenosis. L3-L4: Mild loss of disc height. Disc desiccation. Small diffuse posterior disc bulge with mild bilateral facet arthropathy and mild ligamentum flavum hypertrophy. Mild spinal canal stenosis with mild bilateral neural foraminal stenosis. L4-L5: Moderate to severe loss of disc height. Disc desiccation. Small diffuse posterior disc bulge with mild bilateral facet  "arthropathy. Mild spinal canal stenosis with mild bilateral neural foraminal stenosis. L5-S1: Mild loss of disc height. Disc desiccation. Small diffuse posterior disc bulge with mild bilateral facet arthropathy. Mild spinal canal stenosis with mild bilateral neural foraminal stenosis.      IMPRESSION: 1.  Moderate to severe L4-L5 degenerative disc disease with multilevel mild degenerative disc disease elsewhere. This is accompanied by a small amount of type I Modic endplate degenerative change at L1-L2 and L4-L5. 2.  Multilevel facet arthropathy is mild to moderate bilaterally at L2-L3 but more mild elsewhere. 3.  Multilevel mild spinal canal stenosis with no high-grade spinal canal stenosis at any level. 4.  Multilevel mild bilateral neural foraminal stenosis at L3-L4 to L5-S1. ADITYA BAE MD   SYSTEM ID:  [id number]        Vitamin D:  Vitamin D Deficiency Screening Results:  No results found for: VITDT  No results found for: DPV085, ZTCJ444, RHUQ71EXPVC, VITD3, D2VIT, D3VIT, DTOT, [id number], [id number], [id number], [id number], [id number], [id number]        Nutritional Status:  Estimated body mass index is 25.6 kg/m  as calculated from the following:    Height as of 3/3/22: 1.689 m (5' 6.5\").    Weight as of 3/17/22: 73 kg (161 lb).           Lab Results   Component Value Date     ALBUMIN 4.5 02/19/2019         Diabetes Screening:        Lab Results   Component Value Date     A1C 6.1 02/19/2019         Nicotine Usage:     No                     Physical Exam- historical from 5/5/22  BP (!) 194/106   Pulse 75   Resp 16   Ht 1.715 m (5' 7.5\")   Wt 72.6 kg (160 lb)   SpO2 99%   BMI 24.69 kg/m       Constitutional: Oriented to person, place, and time. Appears well-developed and well-nourished. Cooperative. Musculoskeletal:   Cervical flexion-extension range of motion: normal  Lumbar flexion/extension range of motion: pain with 15 degrees of rotation, extension, flexion    Neurological: alert and oriented to " person, place, and time.   No cranial nerve deficit   sensory deficit denies  Gait antalgic        Reflex Scores:             Bicep reflexes are 2+ on the right side and 2+ on the left side.       Brachioradialis reflexes are 2+ on the right side and 2+ on the left side.       Patellar reflexes are 2+ on the right side and 2+ on the left side.       Achilles reflexes are 2+ on the right side and 2+ on the left side.     STRENGTH LEFT RIGHT   Deltoid 5 5   Bicep 5 5   Wrist Extensor 5 5   Tricep 5 5   Finger flexion 5 5   Finger abduction 5 5    5 5         Hip Flexion       5       5   Knee Extension 5 5   Ankle Dorsiflexion 5 5   Extensor Hallucis Longus 5 5   Plantar Flexion 5 5   Foot eversion 5 5   Foot inversion 5 5     No ankle clonus         Sacroiliac Joint Exam LEFT RIGHT   Natalio Finger Test + +   PSIS tenderness + +   ThighThrust + +   ROEL + +   Pelvic Gapping - -   Pelvic Compression + +   Gaenslen s + +   Sacral Thrust + +   Hip Exam       SCOUR + +   abduction + +          Skin: Skin is warm, dry and intact.   Psychiatric: Normal mood and affect. Speech is normal and behavior is normal.           ASSESSMENT:  Pranav Loo is a 56 year old male with bilateral sacroiliac joint pain, prior bilateral total hip arthroplasties with ongoing pain, L4-5 lumbar spondylosis with back pain and lower lumbar hypolordosis/flatback syndrome     PLAN:     I explained to the patient that I think he has symptoms from 3 different body regions: Bilateral sacroiliac joints, lumbar spine, and bilateral hips.      His sacroiliac joints are exquisitely painful on exam and he reports debilitating symptoms worse when he is sitting, which is consistent with sacroiliac joint pain.  I think his sacroiliac joints have probably become painful over time because of his lower lumbar hypolordosis and he is extending for both his upper lumbar spine and likely through his SI joints to try to compensate for his lower lumbar  "spondylosis.    Diagnostic right sacroiliac joint injection with local anesthetic only no steroids on 5/19/22 gave him about 80% relief initially for one week and then now is 60% relief that has lasted over 20 days. His hip even feels better on the right side    We will order a left diagnostic SI joint injection to evaluate symptom relief on the left side.    Patient instructed:  \"Please call [phone] to schedule the injection that Dr. Doyle ordered of your left sacroiliac joint.  Please see your PCP about your rash across your back- this will need to be resolved prior to any additional injections. Please remind the person performing your injection that you have an allergy to tape adhesive.    If you have lasting relief, you do not need to make an appointment to see Dr. Doyle. You can call [phone] to schedule an appointment to see Dr. Doyle in clinic if you have worsening of your pain.\"      Ashlie Doyle MD    [facility] Department of Neurosurgery  Office: [phone]    6/9/2022  8:31 AM  "

## 2022-06-09 NOTE — PROGRESS NOTES
"Pranav is a 56 year old who is being evaluated via a billable video visit.      How would you like to obtain your AVS? MyChart  If the video visit is dropped, the invitation should be resent by: Text to cell phone: [phone]  Will anyone else be joining your video visit? No  {If patient encounters technical issues they should call [phone] :[id number]}    Video Start Time: {video visit start/end time for provider to select:152948}  Video-Visit Details    Type of service:  Video Visit    Video End Time:{video visit start/end time for provider to select:152948}    Originating Location (pt. Location): {video visit patient location:956817::\"Home\"}    Distant Location (provider location):  [facility] NEUROSURGERY [facility]     Platform used for Video Visit: {Virtual Visit Platforms:448296::\"[employer]\"}  "

## 2022-06-09 NOTE — NURSING NOTE
Chief Complaint   Patient presents with     Video Visit     Follow up, s/p joint injection. Discuss result/pain log and plan   Pt was rude and was not happy about me calling to check him in after he completed it himself.  Ivone Logan 06/09/2022

## 2022-07-14 ENCOUNTER — HOSPITAL ENCOUNTER (OUTPATIENT)
Dept: GENERAL RADIOLOGY | Facility: CLINIC | Age: 57
Discharge: HOME | End: 2022-07-14
Attending: NEUROLOGICAL SURGERY | Admitting: NEUROLOGICAL SURGERY
Payer: COMMERCIAL

## 2022-07-14 VITALS
HEART RATE: 78 BPM | RESPIRATION RATE: 16 BRPM | OXYGEN SATURATION: 98 % | DIASTOLIC BLOOD PRESSURE: 87 MMHG | SYSTOLIC BLOOD PRESSURE: 146 MMHG

## 2022-07-14 DIAGNOSIS — M53.3 PAIN OF LEFT SACROILIAC JOINT: Primary | ICD-10-CM

## 2022-07-14 DIAGNOSIS — M53.3 SACROILIAC JOINT DYSFUNCTION OF BOTH SIDES: ICD-10-CM

## 2022-07-14 PROCEDURE — 250N000009 HC RX 250

## 2022-07-14 PROCEDURE — 27096 INJECT SACROILIAC JOINT: CPT | Mod: LT | Performed by: PHYSICAL MEDICINE & REHABILITATION

## 2022-07-14 PROCEDURE — 250N000011 HC RX IP 250 OP 636

## 2022-07-14 PROCEDURE — 255N000002 HC RX 255 OP 636: Performed by: PHYSICAL MEDICINE & REHABILITATION

## 2022-07-14 PROCEDURE — 27096 INJECT SACROILIAC JOINT: CPT | Mod: LT

## 2022-07-14 RX ORDER — ROPIVACAINE HYDROCHLORIDE 5 MG/ML
1.5 INJECTION, SOLUTION EPIDURAL; INFILTRATION; PERINEURAL ONCE
Status: COMPLETED | OUTPATIENT
Start: 2022-07-14 | End: 2022-07-14

## 2022-07-14 RX ORDER — IOPAMIDOL 408 MG/ML
10 INJECTION, SOLUTION INTRATHECAL ONCE
Status: COMPLETED | OUTPATIENT
Start: 2022-07-14 | End: 2022-07-14

## 2022-07-14 RX ORDER — ROPIVACAINE HYDROCHLORIDE 5 MG/ML
INJECTION, SOLUTION EPIDURAL; INFILTRATION; PERINEURAL
Status: COMPLETED
Start: 2022-07-14 | End: 2022-07-14

## 2022-07-14 RX ORDER — LIDOCAINE HYDROCHLORIDE 10 MG/ML
5 INJECTION, SOLUTION EPIDURAL; INFILTRATION; INTRACAUDAL; PERINEURAL ONCE
Status: COMPLETED | OUTPATIENT
Start: 2022-07-14 | End: 2022-07-14

## 2022-07-14 RX ORDER — LIDOCAINE HYDROCHLORIDE 10 MG/ML
INJECTION, SOLUTION EPIDURAL; INFILTRATION; INTRACAUDAL; PERINEURAL
Status: COMPLETED
Start: 2022-07-14 | End: 2022-07-14

## 2022-07-14 RX ADMIN — LIDOCAINE HYDROCHLORIDE 3 ML: 10 INJECTION, SOLUTION EPIDURAL; INFILTRATION; INTRACAUDAL; PERINEURAL at 09:02

## 2022-07-14 RX ADMIN — IOPAMIDOL 0.5 ML: 408 INJECTION, SOLUTION INTRATHECAL at 09:01

## 2022-07-14 RX ADMIN — ROPIVACAINE HYDROCHLORIDE 1.5 ML: 5 INJECTION, SOLUTION EPIDURAL; INFILTRATION; PERINEURAL at 08:54

## 2022-07-14 NOTE — PROGRESS NOTES
Assisted MD Membreno in left SI joint injection. Medications given per MAR. Pain prior to procedure 5/10. Pain post procedure 5/10. Pt tolerated procedure well. Injection site covered with dressing. Dressing clean, dry and intact at time of discharge. Discharge instructions given and all questions answered. Pt left ambulatory in stable condition.

## 2022-09-08 ENCOUNTER — TELEPHONE (OUTPATIENT)
Dept: NEUROSURGERY | Facility: CLINIC | Age: 57
End: 2022-09-08

## 2022-09-08 NOTE — TELEPHONE ENCOUNTER
LVM asking patient to call us back about getting F/U appt scheduled with  in person next available.        ----- Message from Karlos Alonso RN sent at 9/8/2022  2:09 PM CDT -----  Regarding: Tiarat w/Vivek  Please reach out to patient and coordinate a return appointment with Dr Doyle to discuss next steps    Re: f/u: s/p SI joint injection; discuss next steps, no new imaging at this time    John

## 2022-09-22 ENCOUNTER — OFFICE VISIT (OUTPATIENT)
Dept: NEUROSURGERY | Facility: CLINIC | Age: 57
End: 2022-09-22
Payer: COMMERCIAL

## 2022-09-22 VITALS
HEART RATE: 61 BPM | HEIGHT: 67 IN | DIASTOLIC BLOOD PRESSURE: 99 MMHG | WEIGHT: 152.9 LBS | OXYGEN SATURATION: 99 % | BODY MASS INDEX: 24 KG/M2 | SYSTOLIC BLOOD PRESSURE: 161 MMHG

## 2022-09-22 DIAGNOSIS — M53.3 PAIN OF LEFT SACROILIAC JOINT: Primary | ICD-10-CM

## 2022-09-22 PROCEDURE — 99214 OFFICE O/P EST MOD 30 MIN: CPT | Performed by: NEUROLOGICAL SURGERY

## 2022-09-22 ASSESSMENT — PAIN SCALES - GENERAL: PAINLEVEL: EXTREME PAIN (8)

## 2022-09-22 NOTE — PROGRESS NOTES
"Neurosurgery Clinic Note     Chief Complaint: back and thigh pain     History of Present Illness:  It was a pleasure to evaluate Pranav Loo in clinic today at the kind referral of Sabino Snider, DO  [address] [place] [address].     Pranav Loo is a 56 year old male presenting with low back pain radiating in band across lumbar spine, and into bilateral posterior thighs to above knees, and also pain in lateral thighs and anterior iliac crests bilaterally right worse than left. NO radiating pain to feet. Worst position is sitting. Also uses cane to walk and stand due to pain. Very clear that pain is worst with sitting and with bending/twisting/lifting.      He has prior bilateral hip arthroplasties and Dr. Miller's office note on 3/3/22 ordered imaging and lab studies to evaluate these; Dr. Miller did not feel that there was a sign of infection or reason for further hip intervention, he reviewed the xrays with the patient on 3/3/22 per his note.  Also noted was meralgia paresthetica, abductor tendinitis and abductor pain at the trochanter. Dr. Miller then referred the patient for evaluation of his lumbar spine with Dr. Snider. Dr. Snider saw the patient on 3/17/22, and noted that the patient had prior epidural steroid injections with a prior neurologist per patient report that resulted in transient improvements. Patient feels that \"back pain\" limits his ability to participate in PT for both hip and spine.     On initial evaluation in my clinic 5/5/22, patient had bilateral positive sacroiliac joint exam findings. To further evaluate the possible contribution of the SI joints, we ordered diagnostic SI joint injection with local anesthetic only no steroids.    He underwent a right diagnostic SI joint injection on 5/19/22 with local anesthetic only no steroids.      In pain journal, patient recorded that initial pain relief was 80% and over several days after injection he had resolution of \"gripping " "sharp pain\" that still has stayed resolved, he had positive effect from that injection and is still about 60% improved. His left-sided symptoms became most prominent and painful since his right side was better, so on 7/14/22 he underwent a left sacroiliac joint injection. This relieved 60% of his pain on the left but lasted only one week.           Past Medical History        Past Medical History:   Diagnosis Date     DJD (degenerative joint disease)       left hip     Hypertension              Past Surgical History         Past Surgical History:   Procedure Laterality Date     ARTHROPLASTY HIP   11/5/2012     Procedure: ARTHROPLASTY HIP;  Right Total Hip Arthroplasty ;  Surgeon: Reagan Ritter MD;  Location:  OR      PELVIS/HIP JOINT SURGERY UNLISTED         CV HEART CATHETERIZATION WITH POSSIBLE INTERVENTION N/A 2/21/2019     Procedure: Heart Catheterization with possible Intervention;  Surgeon: Karlos Moy MD;  Location:  HEART CARDIAC CATH LAB     EXTRACAPSULAR CATARACT EXTRATION WITH INTRAOCULAR LENS IMPLANT         bilateral     OPTICAL TRACKING SYSTEM RESURFACING HIP   3/30/2011     Procedure:OPTICAL TRACKING SYSTEM RESURFACING HIP; Surgeon:REAGAN RITTER; Location: OR            Social History            Socioeconomic History     Marital status:    Tobacco Use     Smoking status: Never Smoker   Substance and Sexual Activity     Alcohol use: Yes       Comment: couple times a week     Drug use: Yes       Types: Marijuana         family history includes Coronary Artery Disease Early Onset (age of onset: 41) in his brother.         IMAGING per my own measurement and interpretation:  Xrays: EOS 05/05/22       MRI 3/28/22 lumbar spine with significant disc degeneration at L4-5, mild facet arthropathy, mild foraminal stenosis           Resulted Imaging/Labs:  Bone Density:  XR Pelvis w bilateral hips 1 View     Result Date: 3/3/2022  3 views pelvis radiograph(s) 3/3/2022 12:42 PM " History: Left hip pain; Right hip pain Comparison: 12/18/2012 Findings: 3 view(s) of the pelvis were obtained. Redemonstration of bilateral total hip arthroplasties. 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 On the crosstable lateral view there is also lucency surrounding the more inferior screw noted. The components are otherwise well seated. On the left: Total hip arthroplasty with short stem, well seated. Sacrum and innominate bones are partially obscured by overlying bowel gas/fecal content. Pelvic phlebolith.      Impression: 1. 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 2. Stable left total hip arthroplasty. JUTTA ELLERMANN, MD   SYSTEM ID:  [id number]     XR Lumbar Spine 2-3 Views*     Result Date: 3/3/2022  2 views lumbar spine radiographs 3/3/2022 1:12 PM History: Left hip pain; Right hip pain Comparison: 1/2/2014 Findings: Standing  AP and lateral  views of the lumbar spine were obtained. 5  lumbar type vertebral bodies are assumed for the purpose of this dictation. There is no acute osseous abnormality.  There are multilevel degenerative changes of the lumbar spine, most pronounced at L4/L5 . There is also lower lumbar predominant facet arthropathy. Redemonstration of endplate remodeling at the thoracolumbar junction Vascular calcifications. The visualized bowel gas pattern is non-obstructive.      Impression: 1.  No acute osseous abnormality. 2.  Multilevel degenerative changes most pronounced at L4-L5. JUTTA ELLERMANN, MD   SYSTEM ID:  [id number]     MRI Lumbar spine w/o contrast     Result Date: 3/28/2022  MR LUMBAR SPINE WITHOUT CONTRAST 3/28/2022 10:17 AM INDICATION: Low back pain, more than six weeks. Lumbar degenerative disc  disease. TECHNIQUE: MRI images of the lumbar spine without contrast. CONTRAST:  None. COMPARISON: 3/3/2022 lumbar spine plain film. FINDINGS: Numbering assumes five lumbar type vertebrae. Straightening of the usual lumbar lordosis with minimal retrolisthesis of L4 on L5 and L5 on S1. Small Schmorl's node at the anterior-inferior endplate of L1. Otherwise maintained vertebral body heights. Type I Modic endplate degenerative change at L1-L2 and L4-L5. No additional osseous edema elsewhere. No identifiable pars defect. The conus tip is identified at L2. Unremarkable paraspinal soft tissues. T12-L1: Mild loss of disc height. Disc desiccation. Small diffuse posterior disc bulge. Mild bilateral facet arthropathy with mild ligamentum flavum hypertrophy. Mild spinal canal stenosis. No significant neural foraminal stenosis.    L1-L2: Mild loss of disc height. Disc desiccation. Small diffuse posterior disc bulge with mild bilateral facet arthropathy and mild ligamentum flavum hypertrophy. Mild spinal canal stenosis. No significant neural foraminal stenosis. L2-L3: Minimal loss of disc height. Disc desiccation. Small diffuse posterior disc bulge. Mild to moderate bilateral facet arthropathy with mild ligamentum flavum hypertrophy. Mild spinal canal stenosis with minimal bilateral neural foraminal stenosis. L3-L4: Mild loss of disc height. Disc desiccation. Small diffuse posterior disc bulge with mild bilateral facet arthropathy and mild ligamentum flavum hypertrophy. Mild spinal canal stenosis with mild bilateral neural foraminal stenosis. L4-L5: Moderate to severe loss of disc height. Disc desiccation. Small diffuse posterior disc bulge with mild bilateral facet arthropathy. Mild spinal canal stenosis with mild bilateral neural foraminal stenosis. L5-S1: Mild loss of disc height. Disc desiccation. Small diffuse posterior disc bulge with mild bilateral facet arthropathy. Mild spinal canal stenosis with mild bilateral neural  "foraminal stenosis.      IMPRESSION: 1.  Moderate to severe L4-L5 degenerative disc disease with multilevel mild degenerative disc disease elsewhere. This is accompanied by a small amount of type I Modic endplate degenerative change at L1-L2 and L4-L5. 2.  Multilevel facet arthropathy is mild to moderate bilaterally at L2-L3 but more mild elsewhere. 3.  Multilevel mild spinal canal stenosis with no high-grade spinal canal stenosis at any level. 4.  Multilevel mild bilateral neural foraminal stenosis at L3-L4 to L5-S1. ADITYA BAE MD   SYSTEM ID:  [id number]        Vitamin D:  Vitamin D Deficiency Screening Results:  No results found for: VITDT  No results found for: LEG086, KKLW081, SCGB24BFMUG, VITD3, D2VIT, D3VIT, DTOT, [id number], [id number], [id number], [id number], [id number], [id number]        Nutritional Status:  Body mass index is 23.95 kg/m . as of 09/22/22                Lab Results   Component Value Date     ALBUMIN 4.5 02/19/2019         Diabetes Screening:        Lab Results   Component Value Date     A1C 6.1 02/19/2019         Nicotine Usage:     No                     Physical Exam     BP (!) 161/99 (BP Location: Right arm, Patient Position: Sitting, Cuff Size: Adult Regular)   Pulse 61   Ht 1.702 m (5' 7\")   Wt 69.4 kg (152 lb 14.4 oz)   SpO2 99%   BMI 23.95 kg/m         Constitutional: Oriented to person, place, and time. Appears well-developed and well-nourished. Cooperative. Musculoskeletal:   Cervical flexion-extension range of motion: normal  Lumbar flexion/extension range of motion: pain with 15 degrees of rotation, extension, flexion    Neurological: alert and oriented to person, place, and time.   No cranial nerve deficit   sensory deficit denies  Gait antalgic   negative slump test  Negative SLR/crossed SLR     Reflex Scores:             Bicep reflexes are 2+ on the right side and 2+ on the left side.       Brachioradialis reflexes are 2+ on the right side and 2+ on the left side.      "  Patellar reflexes are 2+ on the right side and 2+ on the left side.       Achilles reflexes are 2+ on the right side and 2+ on the left side.     STRENGTH LEFT RIGHT   Deltoid 5 5   Bicep 5 5   Wrist Extensor 5 5   Tricep 5 5   Finger flexion 5 5   Finger abduction 5 5    5 5         Hip Flexion       5       5   Knee Extension 5 5   Ankle Dorsiflexion 5 5   Extensor Hallucis Longus 5 5   Plantar Flexion 5 5   Foot eversion 5 5   Foot inversion 5 5     No ankle clonus         Sacroiliac Joint Exam LEFT RIGHT   Natalio Finger Test + +   PSIS tenderness + +   ThighThrust + +   ROEL + +   Pelvic Gapping - -   Pelvic Compression + +   Gaenslen s + +   Sacral Thrust + +   Hip Exam       SCOUR + +   abduction + +          Skin: Skin is warm, dry and intact.   Psychiatric: Normal mood and affect. Speech is normal and behavior is normal.           ASSESSMENT:  Pranav Loo is a 56 year old male with bilateral sacroiliac joint pain 80% responsive on right on 5/19/22 and 60% responsive on left on 7/14/22 to unilateral SI joint injection with local anesthetic/no steroids, prior bilateral total hip arthroplasties with ongoing hip pain, L4-5 lumbar spondylosis with back pain     PLAN:     I explained to the patient that I think he has symptoms from 3 different body regions: Bilateral sacroiliac joints, lumbar spine, and bilateral hips.       His sacroiliac joints have positive physical exam findings and responded with significant pain relief to individualized diagnostic injections with local anesthetic only no steroid, first on right side then left side  by greater than one month apart, but the relief was short term. Therefore his pain is consistent with the diagnosis of SI joint pain.      He may have some pain contributed from his L4-5 disc degeneration also, but given the significant relief that he got with SI joint injection this is likely a secondary contributor to his pain. He may warrant lumbar spine  surgery in the future, but at this point in my opinion, he is likely to get the most benefit from SI joint primary fusion surgery given his failure of nonoperative measures to control his pain. I have referred him to one of my Orthopedics partners to evaluate him for potential sacroiliac joint fusion surgery. His symptomatic side is worst on the left currently.    I have also ordered sacroiliac joint-targeted PT because his prior PT was for lumbar spine and hip.    He will followup with my Orthopedics partner for further evaluation.

## 2022-09-22 NOTE — LETTER
"9/22/2022       RE: Pranav Loo  [address] [facility] [address]     Dear Colleague,    Thank you for referring your patient, Pranav Loo, to the [facility] NEUROSURGERY CLINIC [place] [facility]. Please see a copy of my visit note below.    Neurosurgery Clinic Note     Chief Complaint: back and thigh pain     History of Present Illness:  It was a pleasure to evaluate Pranav Loo in clinic today at the kind referral of Sabino Snider, DO  [address] [place] [id number].     Pranav Loo is a 56 year old male presenting with low back pain radiating in band across lumbar spine, and into bilateral posterior thighs to above knees, and also pain in lateral thighs and anterior iliac crests bilaterally right worse than left. NO radiating pain to feet. Worst position is sitting. Also uses cane to walk and stand due to pain. Very clear that pain is worst with sitting and with bending/twisting/lifting.      He has prior bilateral hip arthroplasties and Dr. Miller's office note on 3/3/22 ordered imaging and lab studies to evaluate these; Dr. Miller did not feel that there was a sign of infection or reason for further hip intervention, he reviewed the xrays with the patient on 3/3/22 per his note.  Also noted was meralgia paresthetica, abductor tendinitis and abductor pain at the trochanter. Dr. Miller then referred the patient for evaluation of his lumbar spine with Dr. Snider. Dr. Snider saw the patient on 3/17/22, and noted that the patient had prior epidural steroid injections with a prior neurologist per patient report that resulted in transient improvements. Patient feels that \"back pain\" limits his ability to participate in PT for both hip and spine.     On initial evaluation in my clinic 5/5/22, patient had bilateral positive sacroiliac joint exam findings. To further evaluate the possible contribution of the SI joints, we " "ordered diagnostic SI joint injection with local anesthetic only no steroids.    He underwent a right diagnostic SI joint injection on 5/19/22 with local anesthetic only no steroids.      In pain journal, patient recorded that initial pain relief was 80% and over several days after injection he had resolution of \"gripping sharp pain\" that still has stayed resolved, he had positive effect from that injection and is still about 60% improved. His left-sided symptoms became most prominent and painful since his right side was better, so on 7/14/22 he underwent a left sacroiliac joint injection. This relieved 60% of his pain on the left but lasted only one week.           Past Medical History        Past Medical History:   Diagnosis Date     DJD (degenerative joint disease)       left hip     Hypertension              Past Surgical History         Past Surgical History:   Procedure Laterality Date     ARTHROPLASTY HIP   11/5/2012     Procedure: ARTHROPLASTY HIP;  Right Total Hip Arthroplasty ;  Surgeon: Reagan Ritter MD;  Location:  OR      PELVIS/HIP JOINT SURGERY UNLISTED         CV HEART CATHETERIZATION WITH POSSIBLE INTERVENTION N/A 2/21/2019     Procedure: Heart Catheterization with possible Intervention;  Surgeon: Karlos Moy MD;  Location:  HEART CARDIAC CATH LAB     EXTRACAPSULAR CATARACT EXTRATION WITH INTRAOCULAR LENS IMPLANT         bilateral     OPTICAL TRACKING SYSTEM RESURFACING HIP   3/30/2011     Procedure:OPTICAL TRACKING SYSTEM RESURFACING HIP; Surgeon:REAGAN RITTER; Location: OR            Social History            Socioeconomic History     Marital status:    Tobacco Use     Smoking status: Never Smoker   Substance and Sexual Activity     Alcohol use: Yes       Comment: couple times a week     Drug use: Yes       Types: Marijuana         family history includes Coronary Artery Disease Early Onset (age of onset: 41) in his brother.         IMAGING per my own measurement and " interpretation:  Xrays: EOS 05/05/22       MRI 3/28/22 lumbar spine with significant disc degeneration at L4-5, mild facet arthropathy, mild foraminal stenosis           Resulted Imaging/Labs:  Bone Density:  XR Pelvis w bilateral hips 1 View     Result Date: 3/3/2022  3 views pelvis radiograph(s) 3/3/2022 12:42 PM History: Left hip pain; Right hip pain Comparison: 12/18/2012 Findings: 3 view(s) of the pelvis were obtained. Redemonstration of bilateral total hip arthroplasties. 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 On the crosstable lateral view there is also lucency surrounding the more inferior screw noted. The components are otherwise well seated. On the left: Total hip arthroplasty with short stem, well seated. Sacrum and innominate bones are partially obscured by overlying bowel gas/fecal content. Pelvic phlebolith.      Impression: 1. 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 2. Stable left total hip arthroplasty. JUTTA ELLERMANN, MD   SYSTEM ID:  [id number]     XR Lumbar Spine 2-3 Views*     Result Date: 3/3/2022  2 views lumbar spine radiographs 3/3/2022 1:12 PM History: Left hip pain; Right hip pain Comparison: 1/2/2014 Findings: Standing  AP and lateral  views of the lumbar spine were obtained. 5  lumbar type vertebral bodies are assumed for the purpose of this dictation. There is no acute osseous abnormality.  There are multilevel degenerative changes of the lumbar spine, most pronounced at L4/L5 . There is also lower lumbar predominant facet arthropathy. Redemonstration of endplate remodeling at the thoracolumbar junction Vascular calcifications. The visualized bowel gas pattern is non-obstructive.      Impression: 1.  No acute  osseous abnormality. 2.  Multilevel degenerative changes most pronounced at L4-L5. JUTTA ELLERMANN, MD   SYSTEM ID:  [id number]     MRI Lumbar spine w/o contrast     Result Date: 3/28/2022  MR LUMBAR SPINE WITHOUT CONTRAST 3/28/2022 10:17 AM INDICATION: Low back pain, more than six weeks. Lumbar degenerative disc disease. TECHNIQUE: MRI images of the lumbar spine without contrast. CONTRAST:  None. COMPARISON: 3/3/2022 lumbar spine plain film. FINDINGS: Numbering assumes five lumbar type vertebrae. Straightening of the usual lumbar lordosis with minimal retrolisthesis of L4 on L5 and L5 on S1. Small Schmorl's node at the anterior-inferior endplate of L1. Otherwise maintained vertebral body heights. Type I Modic endplate degenerative change at L1-L2 and L4-L5. No additional osseous edema elsewhere. No identifiable pars defect. The conus tip is identified at L2. Unremarkable paraspinal soft tissues. T12-L1: Mild loss of disc height. Disc desiccation. Small diffuse posterior disc bulge. Mild bilateral facet arthropathy with mild ligamentum flavum hypertrophy. Mild spinal canal stenosis. No significant neural foraminal stenosis.    L1-L2: Mild loss of disc height. Disc desiccation. Small diffuse posterior disc bulge with mild bilateral facet arthropathy and mild ligamentum flavum hypertrophy. Mild spinal canal stenosis. No significant neural foraminal stenosis. L2-L3: Minimal loss of disc height. Disc desiccation. Small diffuse posterior disc bulge. Mild to moderate bilateral facet arthropathy with mild ligamentum flavum hypertrophy. Mild spinal canal stenosis with minimal bilateral neural foraminal stenosis. L3-L4: Mild loss of disc height. Disc desiccation. Small diffuse posterior disc bulge with mild bilateral facet arthropathy and mild ligamentum flavum hypertrophy. Mild spinal canal stenosis with mild bilateral neural foraminal stenosis. L4-L5: Moderate to severe loss of disc height. Disc desiccation. Small diffuse  "posterior disc bulge with mild bilateral facet arthropathy. Mild spinal canal stenosis with mild bilateral neural foraminal stenosis. L5-S1: Mild loss of disc height. Disc desiccation. Small diffuse posterior disc bulge with mild bilateral facet arthropathy. Mild spinal canal stenosis with mild bilateral neural foraminal stenosis.      IMPRESSION: 1.  Moderate to severe L4-L5 degenerative disc disease with multilevel mild degenerative disc disease elsewhere. This is accompanied by a small amount of type I Modic endplate degenerative change at L1-L2 and L4-L5. 2.  Multilevel facet arthropathy is mild to moderate bilaterally at L2-L3 but more mild elsewhere. 3.  Multilevel mild spinal canal stenosis with no high-grade spinal canal stenosis at any level. 4.  Multilevel mild bilateral neural foraminal stenosis at L3-L4 to L5-S1. ADITYA BAE MD   SYSTEM ID:  [id number]        Vitamin D:  Vitamin D Deficiency Screening Results:  No results found for: VITDT  No results found for: HSZ651, OEQC719, PLUD34TLLEZ, VITD3, D2VIT, D3VIT, DTOT, [id number], [id number], [id number], [id number], [id number], [id number]        Nutritional Status:  Body mass index is 23.95 kg/m . as of 09/22/22                Lab Results   Component Value Date     ALBUMIN 4.5 02/19/2019         Diabetes Screening:        Lab Results   Component Value Date     A1C 6.1 02/19/2019         Nicotine Usage:     No                     Physical Exam     BP (!) 161/99 (BP Location: Right arm, Patient Position: Sitting, Cuff Size: Adult Regular)   Pulse 61   Ht 1.702 m (5' 7\")   Wt 69.4 kg (152 lb 14.4 oz)   SpO2 99%   BMI 23.95 kg/m         Constitutional: Oriented to person, place, and time. Appears well-developed and well-nourished. Cooperative. Musculoskeletal:   Cervical flexion-extension range of motion: normal  Lumbar flexion/extension range of motion: pain with 15 degrees of rotation, extension, flexion    Neurological: alert and oriented to person, " place, and time.   No cranial nerve deficit   sensory deficit denies  Gait antalgic   negative slump test  Negative SLR/crossed SLR     Reflex Scores:             Bicep reflexes are 2+ on the right side and 2+ on the left side.       Brachioradialis reflexes are 2+ on the right side and 2+ on the left side.       Patellar reflexes are 2+ on the right side and 2+ on the left side.       Achilles reflexes are 2+ on the right side and 2+ on the left side.     STRENGTH LEFT RIGHT   Deltoid 5 5   Bicep 5 5   Wrist Extensor 5 5   Tricep 5 5   Finger flexion 5 5   Finger abduction 5 5    5 5         Hip Flexion       5       5   Knee Extension 5 5   Ankle Dorsiflexion 5 5   Extensor Hallucis Longus 5 5   Plantar Flexion 5 5   Foot eversion 5 5   Foot inversion 5 5     No ankle clonus         Sacroiliac Joint Exam LEFT RIGHT   Natalio Finger Test + +   PSIS tenderness + +   ThighThrust + +   ROEL + +   Pelvic Gapping - -   Pelvic Compression + +   Gaenslen s + +   Sacral Thrust + +   Hip Exam       SCOUR + +   abduction + +          Skin: Skin is warm, dry and intact.   Psychiatric: Normal mood and affect. Speech is normal and behavior is normal.           ASSESSMENT:  Pranav Loo is a 56 year old male with bilateral sacroiliac joint pain 80% responsive on right on 5/19/22 and 60% responsive on left on 7/14/22 to unilateral SI joint injection with local anesthetic/no steroids, prior bilateral total hip arthroplasties with ongoing hip pain, L4-5 lumbar spondylosis with back pain     PLAN:     I explained to the patient that I think he has symptoms from 3 different body regions: Bilateral sacroiliac joints, lumbar spine, and bilateral hips.       His sacroiliac joints have positive physical exam findings and responded with significant pain relief to individualized diagnostic injections with local anesthetic only no steroid, first on right side then left side  by greater than one month apart, but the relief  was short term. Therefore his pain is consistent with the diagnosis of SI joint pain.      He may have some pain contributed from his L4-5 disc degeneration also, but given the significant relief that he got with SI joint injection this is likely a secondary contributor to his pain. He may warrant lumbar spine surgery in the future, but at this point in my opinion, he is likely to get the most benefit from SI joint primary fusion surgery given his failure of nonoperative measures to control his pain. I have referred him to one of my Orthopedics partners to evaluate him for potential sacroiliac joint fusion surgery. His symptomatic side is worst on the left currently.    I have also ordered sacroiliac joint-targeted PT because his prior PT was for lumbar spine and hip.    He will followup with my Orthopedics partner for further evaluation.      Sincerely,    Ashlie Doyle MD

## 2022-09-22 NOTE — PATIENT INSTRUCTIONS
Dr. Doyle ordered sacroiliac joint PT and referred you to see one of her Orthopedics partners to discuss surgical treatment for the SI joints; they will call you to discuss.

## 2022-10-03 ENCOUNTER — THERAPY VISIT (OUTPATIENT)
Dept: PHYSICAL THERAPY | Facility: CLINIC | Age: 57
End: 2022-10-03
Attending: NEUROLOGICAL SURGERY
Payer: COMMERCIAL

## 2022-10-03 DIAGNOSIS — M53.3 PAIN OF LEFT SACROILIAC JOINT: ICD-10-CM

## 2022-10-03 DIAGNOSIS — G89.29 CHRONIC BILATERAL LOW BACK PAIN WITH BILATERAL SCIATICA: ICD-10-CM

## 2022-10-03 DIAGNOSIS — M54.42 CHRONIC BILATERAL LOW BACK PAIN WITH BILATERAL SCIATICA: ICD-10-CM

## 2022-10-03 DIAGNOSIS — M54.41 CHRONIC BILATERAL LOW BACK PAIN WITH BILATERAL SCIATICA: ICD-10-CM

## 2022-10-03 PROCEDURE — 97162 PT EVAL MOD COMPLEX 30 MIN: CPT | Mod: GP | Performed by: PHYSICAL THERAPIST

## 2022-10-03 PROCEDURE — 97110 THERAPEUTIC EXERCISES: CPT | Mod: GP | Performed by: PHYSICAL THERAPIST

## 2022-10-03 NOTE — PROGRESS NOTES
"Physical Therapy Initial Evaluation  Subjective:  The history is provided by the patient. No  was used.   Patient Health History           General health as reported by patient is good.  Pertinent medical history includes: high blood pressure and osteoarthritis.   Red flags:  None as reported by patient.  Medical allergies: adhesives.   Surgeries include:  Other and orthopedic surgery. Other surgery history details: B MEG.    Current medications:  High blood pressure medication. Other medications details: OTC pain reliever.    Current occupation is Disability and works for himself as able in [employer].                     Therapist Generated HPI Evaluation  Problem details: MD order for PT 9-22-22 for left SI pain.  Patient had onset of LBP about 13 years ago - was heavy labor (landscraping, etc.), no specific injury.  He had bilateral hip replacements (right 2012 and left 2011).  Back pain has progressed, had right SI injection May 2022 which gave some relief, left SI injection July 2022 without relief.  Symptoms have exacerbated over the last couple weeks.  He does exercises every day consisting of hip flexion and abduction and counter, knees to chest in bed (at least 2x/day), stationary bicycle.  At this time pain (\"burning/pressure\") is rated at 7-9/10:  constant LB, bilateral buttocks and posterior thighs, constant \"cramp/tight\" feeling bilateral calves and intermittent cramping bilateral feet with near constant \"tingle\" bilateral LEs.  Symptoms increase with bending, standing and sitting tolerance limited to 10', walking 1 block, losing 4+ hours sleep/night, coughing, having a bowel movement, unable to stand erect after sitting or in the a.m.., turning when standing is \"sharp\", stairs (reciprocal with SEC and railing), needs help with getting socks on intermittently.  Symptoms decrease with  stationary bicycle.  Patient has been using SEC since having his hip replacements (for balance)..  "                    Pain is worse in the A.M..  Since onset symptoms are gradually worsening.     Special tests included:  X-ray.    Restrictions due to condition include:  Working in normal job with restrictions (works for himself - does as much as he can).  Barriers include:  Stairs and requires assistance with ADL's.                        Objective:  Standing Alignment:        Lumbar:  Lordosis decr            Gait:  Slow yecenia, minimal trunk rotation  Gait Type:  Antalgic   Assistive Devices:  Cane                 Lumbar/SI Evaluation  ROM:    AROM Lumbar:   Flexion:        Hands to knees  Ext:                    10%   Side Bend:        Left:     Right:   Rotation:           Left:     Right:   Side Glide:        Left:     Right:           Lumbar Myotomes:  normal            Lumbar DTR's:  not assessed        Lumbar Dermtomes:  normal                Neural Tension/Mobility:    Left side:  Slump positive.   Right side:   Slump positive.                                                       Symptoms prior to test movements:  Pain LB, bilateral thighs, tight calves  Correction of sitting posture with early compliance roll:  No effect  Prone over 1 pillow:  Decrease thigh pain  REIL (minimal ROM) over 1 pillow:  Increase LBP/no worse  Prone over 2 pillows:  No effect  REIL over 2 pillows:  Increase pain/no worse (more painful than with 1)  Prone 0 pillows:  8/10 LB, 0/10 LEs   REIL:  Increase LB/no worse      Assessment/Plan:    Patient is a 57 year old male with lumbar complaints.    Patient has the following significant findings with corresponding treatment plan.                Diagnosis 1:  L:BP with bilateral LE pain    Pain -  hot/cold therapy, self management, education and home program  Decreased ROM/flexibility - manual therapy, therapeutic exercise and home program  Inflammation - cold therapy and self management/home program  Decreased function - therapeutic activities and home program  Impaired posture -  neuro re-education and home program    Therapy Evaluation Codes:   1) History comprised of:   Personal factors that impact the plan of care:      Profession and Time since onset of symptoms.    Comorbidity factors that impact the plan of care are:      Implanted device, Osteoarthritis and Pain at night/rest.     Medications impacting care: None.  2) Examination of Body Systems comprised of:   Body structures and functions that impact the plan of care:      Lumbar spine and Sacral illiac joint.   Activity limitations that impact the plan of care are:      Bending, Driving, Dressing, Lifting, Sitting, Stairs, Standing, Walking and Sleeping.  3) Clinical presentation characteristics are:   Evolving/Changing.  4) Decision-Making    Moderate complexity using standardized patient assessment instrument and/or measureable assessment of functional outcome.  Cumulative Therapy Evaluation is: Moderate complexity.    Previous and current functional limitations:  (See Goal Flow Sheet for this information)    Short term and Long term goals: (See Goal Flow Sheet for this information)     Communication ability:  Patient appears to be able to clearly communicate and understand verbal and written communication and follow directions correctly.  Treatment Explanation - The following has been discussed with the patient:   RX ordered/plan of care  Anticipated outcomes  Possible risks and side effects  This patient would benefit from PT intervention to resume normal activities.   Rehab potential is fair.    Frequency:  1 X week, once daily  Duration:  for 8 weeks  Discharge Plan:  Achieve all LTG.  Independent in home treatment program.  Reach maximal therapeutic benefit.    Please refer to the daily flowsheet for treatment today, total treatment time and time spent performing 1:1 timed codes.

## 2022-10-03 NOTE — PROGRESS NOTES
[facility]    OUTPATIENT Physical Therapy ORTHOPEDIC EVALUATION  PLAN OF TREATMENT FOR OUTPATIENT REHABILITATION  (COMPLETE FOR INITIAL CLAIMS ONLY)  Patient's Last Name, First Name, M.I.  YOB: 1965  Pranav Loo    Provider s Name:  [facility]   Medical Record No.  [id number]   Start of Care Date:  10/03/22   Onset Date:   09/22/22 (MD order date)   Type:     _X__PT   ___OT Medical Diagnosis:    Encounter Diagnosis   Name Primary?    Pain of left sacroiliac joint         Treatment Diagnosis:  L SI pain        Goals:     10/03/22 0500   Body Part   Goals listed below are for Lumbar   Goal #1   Goal #1 standing   Current Functional Level Minutes patient can stand   Performance level 10 with up to 9/10 pain   STG Target Performance Minutes patient will be able to stand   Performance level 10 with 5/10 pain or less   Rationale for housekeeping tasks such as vacuuming, bed making, mowing, gardening;for personal hygiene;for meal preparation;for safe household ambulation;for safe community ambulation;for return to work duties   Due date 10/24/22   LTG Target Performance Minutes patient will be able to stand   Performance Level /walk 30 with 2/10 pain or less   Rationale for housekeeping tasks such as vacuuming, bed making, mowing;for personal hygiene;for meal preparation;for safe household ambulation;for safe community ambulation;for return to work duties   Due date 11/28/22   Goal #2   Goal #2 sleeping   Current Functional Level 3-5 hours without sleep per night   STG Target Performance 2-3 hours without sleep per night   Rationale to establish restorative sleep pattern   Due Date 10/24/22   LTG Target Performance Less than 1 hour without sleep per night   Rationale to establish restorative sleep pattern   Due Date 11/28/22       Therapy Frequency:   1x/week  Predicted Duration of Therapy Intervention:  8 weeks    Britany Barton, PT                 I CERTIFY THE NEED FOR THESE SERVICES FURNISHED UNDER        THIS PLAN OF TREATMENT AND WHILE UNDER MY CARE     (Physician attestation of this document indicates review and certification of the therapy plan).                     Certification Date From:  10/03/22   Certification Date To:  12/02/22    Referring Provider:  Ashlie Doyle    Initial Assessment        See Epic Evaluation SOC Date: 10/03/22

## 2022-10-10 ENCOUNTER — THERAPY VISIT (OUTPATIENT)
Dept: PHYSICAL THERAPY | Facility: CLINIC | Age: 57
End: 2022-10-10
Payer: COMMERCIAL

## 2022-10-10 DIAGNOSIS — M54.42 CHRONIC BILATERAL LOW BACK PAIN WITH BILATERAL SCIATICA: Primary | ICD-10-CM

## 2022-10-10 DIAGNOSIS — M54.41 CHRONIC BILATERAL LOW BACK PAIN WITH BILATERAL SCIATICA: Primary | ICD-10-CM

## 2022-10-10 DIAGNOSIS — G89.29 CHRONIC BILATERAL LOW BACK PAIN WITH BILATERAL SCIATICA: Primary | ICD-10-CM

## 2022-10-10 PROCEDURE — 97110 THERAPEUTIC EXERCISES: CPT | Mod: GP | Performed by: PHYSICAL THERAPIST

## 2022-10-10 PROCEDURE — 97530 THERAPEUTIC ACTIVITIES: CPT | Mod: GP | Performed by: PHYSICAL THERAPIST

## 2022-10-16 ENCOUNTER — HEALTH MAINTENANCE LETTER (OUTPATIENT)
Age: 57
End: 2022-10-16

## 2022-11-07 ENCOUNTER — THERAPY VISIT (OUTPATIENT)
Dept: PHYSICAL THERAPY | Facility: CLINIC | Age: 57
End: 2022-11-07
Payer: COMMERCIAL

## 2022-11-07 DIAGNOSIS — M54.42 CHRONIC BILATERAL LOW BACK PAIN WITH BILATERAL SCIATICA: Primary | ICD-10-CM

## 2022-11-07 DIAGNOSIS — M54.41 CHRONIC BILATERAL LOW BACK PAIN WITH BILATERAL SCIATICA: Primary | ICD-10-CM

## 2022-11-07 DIAGNOSIS — G89.29 CHRONIC BILATERAL LOW BACK PAIN WITH BILATERAL SCIATICA: Primary | ICD-10-CM

## 2022-11-07 PROCEDURE — 97110 THERAPEUTIC EXERCISES: CPT | Mod: GP | Performed by: PHYSICAL THERAPIST

## 2022-11-07 PROCEDURE — 97140 MANUAL THERAPY 1/> REGIONS: CPT | Mod: GP | Performed by: PHYSICAL THERAPIST

## 2022-11-14 ENCOUNTER — THERAPY VISIT (OUTPATIENT)
Dept: PHYSICAL THERAPY | Facility: CLINIC | Age: 57
End: 2022-11-14
Payer: COMMERCIAL

## 2022-11-14 DIAGNOSIS — M54.41 CHRONIC BILATERAL LOW BACK PAIN WITH BILATERAL SCIATICA: Primary | ICD-10-CM

## 2022-11-14 DIAGNOSIS — M54.42 CHRONIC BILATERAL LOW BACK PAIN WITH BILATERAL SCIATICA: Primary | ICD-10-CM

## 2022-11-14 DIAGNOSIS — G89.29 CHRONIC BILATERAL LOW BACK PAIN WITH BILATERAL SCIATICA: Primary | ICD-10-CM

## 2022-11-14 PROCEDURE — 97140 MANUAL THERAPY 1/> REGIONS: CPT | Mod: GP | Performed by: PHYSICAL THERAPIST

## 2022-11-14 PROCEDURE — 97110 THERAPEUTIC EXERCISES: CPT | Mod: GP | Performed by: PHYSICAL THERAPIST

## 2022-11-28 ENCOUNTER — THERAPY VISIT (OUTPATIENT)
Dept: PHYSICAL THERAPY | Facility: CLINIC | Age: 57
End: 2022-11-28
Payer: COMMERCIAL

## 2022-11-28 DIAGNOSIS — M54.42 CHRONIC BILATERAL LOW BACK PAIN WITH BILATERAL SCIATICA: Primary | ICD-10-CM

## 2022-11-28 DIAGNOSIS — G89.29 CHRONIC BILATERAL LOW BACK PAIN WITH BILATERAL SCIATICA: Primary | ICD-10-CM

## 2022-11-28 DIAGNOSIS — M54.41 CHRONIC BILATERAL LOW BACK PAIN WITH BILATERAL SCIATICA: Primary | ICD-10-CM

## 2022-11-28 PROCEDURE — 97110 THERAPEUTIC EXERCISES: CPT | Mod: GP | Performed by: PHYSICAL THERAPIST

## 2022-11-28 PROCEDURE — 97112 NEUROMUSCULAR REEDUCATION: CPT | Mod: GP | Performed by: PHYSICAL THERAPIST

## 2022-12-05 ENCOUNTER — THERAPY VISIT (OUTPATIENT)
Dept: PHYSICAL THERAPY | Facility: CLINIC | Age: 57
End: 2022-12-05
Payer: COMMERCIAL

## 2022-12-05 DIAGNOSIS — M54.42 CHRONIC BILATERAL LOW BACK PAIN WITH BILATERAL SCIATICA: Primary | ICD-10-CM

## 2022-12-05 DIAGNOSIS — M54.41 CHRONIC BILATERAL LOW BACK PAIN WITH BILATERAL SCIATICA: Primary | ICD-10-CM

## 2022-12-05 DIAGNOSIS — G89.29 CHRONIC BILATERAL LOW BACK PAIN WITH BILATERAL SCIATICA: Primary | ICD-10-CM

## 2022-12-05 PROCEDURE — 97110 THERAPEUTIC EXERCISES: CPT | Mod: GP | Performed by: PHYSICAL THERAPIST

## 2022-12-05 PROCEDURE — 97140 MANUAL THERAPY 1/> REGIONS: CPT | Mod: GP | Performed by: PHYSICAL THERAPIST

## 2022-12-12 ENCOUNTER — THERAPY VISIT (OUTPATIENT)
Dept: PHYSICAL THERAPY | Facility: CLINIC | Age: 57
End: 2022-12-12
Payer: COMMERCIAL

## 2022-12-12 DIAGNOSIS — M54.42 CHRONIC BILATERAL LOW BACK PAIN WITH BILATERAL SCIATICA: Primary | ICD-10-CM

## 2022-12-12 DIAGNOSIS — M54.41 CHRONIC BILATERAL LOW BACK PAIN WITH BILATERAL SCIATICA: Primary | ICD-10-CM

## 2022-12-12 DIAGNOSIS — G89.29 CHRONIC BILATERAL LOW BACK PAIN WITH BILATERAL SCIATICA: Primary | ICD-10-CM

## 2022-12-12 PROCEDURE — 97530 THERAPEUTIC ACTIVITIES: CPT | Mod: GP | Performed by: PHYSICAL THERAPIST

## 2022-12-12 PROCEDURE — 97110 THERAPEUTIC EXERCISES: CPT | Mod: GP | Performed by: PHYSICAL THERAPIST

## 2022-12-12 PROCEDURE — 97140 MANUAL THERAPY 1/> REGIONS: CPT | Mod: GP | Performed by: PHYSICAL THERAPIST

## 2022-12-12 NOTE — PROGRESS NOTES
[facility]    OUTPATIENT Physical Therapy ORTHOPEDIC EVALUATION  PLAN OF TREATMENT FOR OUTPATIENT REHABILITATION  (COMPLETE FOR INITIAL CLAIMS ONLY)  Patient's Last Name, First Name, M.I.  YOB: 1965  Pranav Loo    Provider s Name:  [facility]   Medical Record No.  [id number]   Start of Care Date:  10/03/22   Onset Date:   09/22/22 (MD order date)   Treatment Diagnosis:  L SI pain Medical Diagnosis:  Chronic bilateral low back pain with bilateral sciatica       Goals:     12/05/22 0916   Body Part   Goals listed below are for Lumbar   Goal #1   Goal #1 standing   Current Functional Level Hours patient can stand   Performance level 1-2 with 3/10 pain hips   STG Target Performance Minutes patient will be able to stand   Performance level 10 with 5/10 pain or less   Rationale for housekeeping tasks such as vacuuming, bed making, mowing, gardening;for personal hygiene;for meal preparation;for safe household ambulation;for safe community ambulation;for return to work duties   Due date 10/24/22   Date Goal Met 11/07/22   LTG Target Performance Minutes patient will be able to stand   Performance Level /walk 60 with 2/10 pain or less   Rationale for housekeeping tasks such as vacuuming, bed making, mowing;for personal hygiene;for meal preparation;for safe household ambulation;for safe community ambulation;for return to work duties   Due date 01/30/23   If goal not met, Why? progressing, extend goal and timeframe   Goal #2   Goal #2 sleeping   Current Functional Level Less than 1 hour without sleep per night   STG Target Performance 2-3 hours without sleep per night   Rationale to establish restorative sleep pattern   Due Date 11/21/22   Date Goal Met 11/28/22   LTG Target Performance Less than 1 hour without sleep per night   Rationale to establish restorative sleep  pattern   Due Date 11/28/22   Date Goal Met 11/28/22       Therapy Frequency:  1x/week for 2 weeks, then 2x/month for 3 visits  Predicted Duration of Therapy Intervention:       Britany Barton, PT                 I CERTIFY THE NEED FOR THESE SERVICES FURNISHED UNDER        THIS PLAN OF TREATMENT AND WHILE UNDER MY CARE     (Physician attestation of this document indicates review and certification of the therapy plan).                     Certification Date From:  12/03/22   Certification Date To:  02/13/22    Referring Provider:  Ashlie Doyle    Initial Assessment        See Epic Evaluation SOC Date: 10/03/22

## 2022-12-12 NOTE — PROGRESS NOTES
"Subjective:  HPI  Physical Exam                    Objective:  System    Physical Exam    General     ROS    Assessment/Plan:    PROGRESS  REPORT    Progress reporting period is from 10-3-22 to 12-5-22, 6 visits      SUBJECTIVE  Patient initially had x/o chronic LBP about 13 years ago due to heavy labor (landscraping, etc.), no specific injury.  He had bilateral hip replacements (right 2012 and left 2011).  Back pain  progressed, had right SI injection May 2022 which gave some relief, left SI injection July 2022 without relief.  Symptoms were increasing over the couple weeks prior to starting PT.  He reported pain (\"burning/pressure\") rated at 7-9/10:  constant LB, bilateral buttocks and posterior thighs, constant \"cramp/tight\" feeling bilateral calves and intermittent cramping bilateral feet with near constant \"tingle\" bilateral LEs.  Symptoms increased with bending, standing and sitting tolerance were limited to 10', was able to walk 1 block, was losing 4+ hours sleep/night, increases pain with coughing, having a bowel movement, was unable to stand erect after sitting or in the a.m.  He would get \"sharp\" pain when turning in standing, needed help with getting socks on intermittently.      Patient reports steady improvement with intermittent exacerbations with activity, last 1-2 days.  Today he reports \"throb\"/soreness bilateral lateral buttocks, constant, most sore first thing in a.m. as well as LBP in the a.m.  He has not been having symptoms in the lower legs and feet.  Standing and walking tolerance and tolerance to general activity is increasing.  He is now losing less than 1 hour sleep due to pain.      Current Pain level: 3/10.      Initial Pain level:  (7-9/10).   Changes in function:  Yes (See Goal flowsheet attached for changes in current functional level)  Adverse reaction to treatment or activity: activity - prolonged walking    OBJECTIVE  Patient ambulating without assistive device, slow yecenia, " Addended by: GLENNY ISAACS on: 10/6/2017 03:39 PM     Modules accepted: Orders     decreased trunk rotation.    AROM lumbar extension WNL without increased discomfort.  Patient tolerating gradual increase strength program without increase pain.         ASSESSMENT/PLAN  Updated problem list and treatment plan: Diagnosis 1:  LBP with bilateral sciatica    Pain -  hot/cold therapy, self management, education and home program  Decreased strength - therapeutic exercise, therapeutic activities and home program  Inflammation - cold therapy and self management/home program  Impaired gait - home program  Decreased function - therapeutic activities and home program  STG/LTGs have been met or progress has been made towards goals:  Yes (See Goal flow sheet completed today.)  Assessment of Progress: The patient's condition is improving.  Self Management Plans:  Patient has been instructed in a home treatment program.  I have re-evaluated this patient and find that the nature, scope, duration and intensity of the therapy is appropriate for the medical condition of the patient.  Pranav continues to require the following intervention to meet STG and LTG's:  PT    Recommendations:  This patient would benefit from continued therapy.     Frequency:  1 X week  once daily for 2 weeks, then 2x/month once daily for 3 visits          Please refer to the daily flowsheet for treatment today, total treatment time and time spent performing 1:1 timed codes.

## 2023-05-02 NOTE — PROGRESS NOTES
"Discharge Note    Progress reporting period is from 10-3-22  to Dec 12, 2022.      Pranav failed to follow up and current status is unknown.  Please see information below for last relevant information on current status.  Patient seen for 7 visits.    SUBJECTIVE  At last visit patient reported \"soreness\" bilateral lateral hips/upper thighs constant ~3/10, minimal LBP (primarily in a.m. before having BM).  Improving - tolerance to actiivty increasing.  Walked 1 hour on 12-10-22, increased lateral hip soreness yesterday and today, up to 6-7/10. Compliant with HEP.    .  Current pain level is 5/10.     Previous pain level was   (7-9/10).   Changes in function:  Yes (See Goal flowsheet attached for changes in current functional level)  Adverse reaction to treatment or activity: None    OBJECTIVE  Unknown at discharge as patient did not return.    ASSESSMENT/PLAN  Diagnosis: L SI pain   Patient did not return for further visits so problems/goal status is unknown.    Recommendations:  Patient did not return for follow-up visits as expected.  Will discharge physical therapy chart at this time.      Please refer to the daily flowsheet for treatment today, total treatment time and time spent performing 1:1 timed codes.        "

## 2023-06-01 ENCOUNTER — HEALTH MAINTENANCE LETTER (OUTPATIENT)
Age: 58
End: 2023-06-01

## 2023-06-19 ENCOUNTER — TRANSCRIBE ORDERS (OUTPATIENT)
Dept: FAMILY MEDICINE | Facility: CLINIC | Age: 58
End: 2023-06-19
Payer: COMMERCIAL

## 2023-06-19 DIAGNOSIS — M67.911 TENDINOPATHY OF RIGHT ROTATOR CUFF: Primary | ICD-10-CM

## 2023-06-27 ASSESSMENT — ACTIVITIES OF DAILY LIVING (ADL)
PLACING_AN_OBJECT_ON_A_HIGH_SHELF: 9
THROWING_A_BALL: EXTREME DIFFICULTY
PUTTING_ON_AN_UNDERSHIRT_OR_A_PULLOVER_SWEATER: 9
DRESS: MODERATE DIFFICULTY
REACHING_FOR_SOMETHING_ON_A_HIGH_SHELF: 8
WASHING_YOUR_HAIR?: 9
PUSHING_WITH_THE_INVOLVED_ARM: 9
PLACING_AN_OBJECT_ONTO,_OR_REMOVING_IT_FROM_AN_OVERHEAD_SHELF: EXTREME DIFFICULTY
CLEANING: QUITE A BIT OF DIFFICULTY
VACUUMING,_SWEEPING,_OR_RAKING: QUITE A BIT OF DIFFICULTY
SLEEPING: QUITE A BIT OF DIFFICULTY
WHEN_LYING_ON_THE_INVOLVED_SIDE: 9
ANY_OF_YOUR_USUAL_WORK,_HOUSEWORK_OR_SCHOOL_ACTIVITIES: QUITE A BIT OF DIFFICULTY
UEFI_TOTAL_SCORE/80: .25
OPENING_DOORS: MODERATE DIFFICULTY
LIFTING_A_BAG_OF_GROCERIES_TO_WAIST_LEVEL: EXTREME DIFFICULTY
UEFI_TOTAL_SCORE: 20
LAUNDERING_CLOTHES: QUITE A BIT OF DIFFICULTY
DRIVING: NO DIFFICULTY
TYING_OR_LACING_SHOES: QUITE A BIT OF DIFFICULTY
PUTTING_ON_YOUR_PANTS: 7
PUTTING_ON_A_SHIRT_THAT_BUTTONS_DOWN_THE_FRONT: 8
TOUCHING_THE_BACK_OF_YOUR_NECK: 8
OPENING_A_JAR: EXTREME DIFFICULTY
WASHING_YOUR_BACK: 9
PLEASE_INDICATE_YOR_PRIMARY_REASON_FOR_REFERRAL_TO_THERAPY:: ELBOW
WASHING_YOUR_HAIR_OR_SCALP: QUITE A BIT OF DIFFICULTY
DOING_UP_BUTTONS: MODERATE DIFFICULTY
YOUR_USUAL_HOBBIES,_RECREATIONAL_OR_SPORTING_ACTIVITIES: QUITE A BIT OF DIFFICULTY
AT_ITS_WORST?: 9
PLEASE_INDICATE_YOR_PRIMARY_REASON_FOR_REFERRAL_TO_THERAPY:: SHOULDER
REMOVING_SOMETHING_FROM_YOUR_BACK_POCKET: 9
CARRYING_A_SMALL_SUITCASE_WITH_YOUR_AFFECTED_LIMB: EXTREME DIFFICULTY
USING_TOOLS_OR_APPLIANCES: QUITE A BIT OF DIFFICULTY
PREPARING_FOOD: QUITE A BIT OF DIFFICULTY
CARRYING_A_HEAVY_OBJECT_OF_10_POUNDS: 10
PUSHING_UP_ON_YOUR_HANDS: QUITE A BIT OF DIFFICULTY

## 2023-06-29 ENCOUNTER — THERAPY VISIT (OUTPATIENT)
Dept: PHYSICAL THERAPY | Facility: CLINIC | Age: 58
End: 2023-06-29
Attending: FAMILY MEDICINE
Payer: COMMERCIAL

## 2023-06-29 DIAGNOSIS — M25.511 SHOULDER PAIN, RIGHT: ICD-10-CM

## 2023-06-29 DIAGNOSIS — M67.911 TENDINOPATHY OF RIGHT ROTATOR CUFF: ICD-10-CM

## 2023-06-29 PROCEDURE — 97110 THERAPEUTIC EXERCISES: CPT | Mod: GP | Performed by: PHYSICAL THERAPIST

## 2023-06-29 PROCEDURE — 97161 PT EVAL LOW COMPLEX 20 MIN: CPT | Mod: GP | Performed by: PHYSICAL THERAPIST

## 2023-06-29 NOTE — PROGRESS NOTES
"PHYSICAL THERAPY EVALUATION  Type of Visit: Evaluation    See electronic medical record for Abuse and Falls Screening details.    Subjective      Presenting condition or subjective complaint: 6-8 months shoulder and 2 weeks right shoulder  Date of onset: 10/29/22    Relevant medical history: -- (chronic LBP)   Dates & types of surgery: R MEG 2012, left hip resurfacing    Prior diagnostic imaging/testing results: -- (none)     Prior therapy history for the same diagnosis, illness or injury: No      Prior Level of Function   Transfers: Independent  Ambulation: Independent  ADL: Independent      Living Environment  Social support: With a significant other or spouse   Type of home: House   Stairs to enter the home: Yes 2 Is there a railing: Yes   Ramp: No   Stairs inside the home: Yes 11 Is there a railing: Yes   Help at home: None  Equipment owned: Straight Cane; Walker; Crutches; Standard wheelchair     Employment: Yes up to 10 hours/week  Hobbies/Interests: none    Patient goals for therapy: to have less pain. so i can work    6-8 months ago patient was lifting furniture and had onset of anterior/posterior right shoulder pain.  He saw MD recently, steroid injection 6-19-23 which he states took about ~70% of the pain.  2 weeks ago he had insidious onset of medial right elbow pain.  Pain ranges 3-8/10, describes as \"throb\", gets \"sharp\" with movement.  He denies cervical or left-sided pain.  Symptoms increase right shoulder with reaching any direction, pushing down lightly through hand, losing up to 4 hours sleep/night (combination of shoulder and low back), unable to sleep on the right side.  Elbow pain increases with gripping, unable to lift gallon of milk.  Symptoms decrease with nothing.   He started exercises given by MD Shavon hannah all directions, shoulder isometrics and AG exercise, theraband exercises.  Patient is right-handed.   Objective   SHOULDER EVALUATION    INTEGUMENTARY (edema, incisions): " WNL  POSTURE/OBSERVATION: rounded shoulder posture, atrophy noted supraspinous and infraspinous process on the right     ROM:   (Degrees) Left AROM Left PROM Right AROM  Right PROM   Shoulder Flexion WNL  101 with pain past 90 degrees WNL, no increase pain   Shoulder Extension WNL  50% with increased pain    Shoulder Abduction WNL  88 with pain during WNL, no increase pain   Shoulder Adduction       Shoulder Ext/IR T4  T12 with pain    Shoulder External Rotation 93 at 90 degrees abduction  91 degrees at 90 degrees abd with pain    Shoulder Horizontal Abduction       Shoulder Horizontal Adduction       Shoulder Flexion ER       Shoulder Flexion IR       Elbow Extension hyperextension  Min loss with pain    Elbow Flexion WNL  Min loss with pain        STRENGTH:   Left shoulder 4+/5  Right shoulder flexion and abduction 2/5, IR 4-/5 with pain, ER 4-/5 with pain     Positive impingement testing right    CERVICAL SCREEN: WNL    Trial repeated   Assessment & Plan   CLINICAL IMPRESSIONS   Medical Diagnosis: R RC tendinopathy    Treatment Diagnosis: Right shoulder pain and weakness   Impression/Assessment: Patient is a 57 year old male with right shoulder and more recent right medial elbow complaints.  The following significant findings have been identified: Pain, Decreased ROM/flexibility, Decreased strength, Inflammation and Decreased activity tolerance. These impairments interfere with their ability to perform self care tasks, work tasks, recreational activities and household chores as compared to previous level of function.     Clinical Decision Making (Complexity):   Clinical Presentation: Stable/Uncomplicated  Clinical Presentation Rationale: based on medical and personal factors listed in PT evaluation  Clinical Decision Making (Complexity): Low complexity    PLAN OF CARE  Treatment Interventions:  Interventions: Therapeutic Activity, Therapeutic Exercise, Self-Care/Home Management    Long Term Goals     PT Goal  1  Goal Identifier: Reaching  Goal Description: AROM right shoulder flexion and abduction 120 degrees or greater with 3/10 pain or less  Rationale: to maximize safety and independence with performance of ADLs and functional tasks;to maximize safety and independence within the home;to maximize safety and independence with self cares  Target Date: 09/07/23      Frequency of Treatment: 1x/week  Duration of Treatment: 6 weeks, then 2x/month for 2 visits       Education Assessment:        Risks and benefits of evaluation/treatment have been explained.   Patient/Family/caregiver agrees with Plan of Care.     Evaluation Time:     PT Eval, Low Complexity Minutes (20328): 30      Signing Clinician: Britany Barton PT      [facility]                                                                                   OUTPATIENT PHYSICAL THERAPY      PLAN OF TREATMENT FOR OUTPATIENT REHABILITATION   Patient's Last Name, First Name, Pranav Stanley YOB: 1965   Provider's Name   [facility]   Medical Record No.  [id number]     Onset Date: 10/29/22  Start of Care Date: 06/29/23     Medical Diagnosis:  R RC tendinopathy      PT Treatment Diagnosis:  Right shoulder pain and weakness Plan of Treatment  Frequency/Duration: 1x/week/ 6 weeks, then 2x/month for 2 visits    Certification date from 06/29/23 to 09/07/23         See note for plan of treatment details and functional goals     Britany Barton, PT                         I CERTIFY THE NEED FOR THESE SERVICES FURNISHED UNDER        THIS PLAN OF TREATMENT AND WHILE UNDER MY CARE .             Physician Signature               Date    X_____________________________________________________                        Referring Provider:  Shady Rivera      Initial Assessment  See Epic Evaluation- Start of Care Date: 06/29/23

## 2023-09-21 NOTE — PROGRESS NOTES
"    DISCHARGE  Reason for Discharge: Patient has failed to schedule further appointments after the evaluation.    Equipment Issued: none    Discharge Plan: Patient to continue home program.   06/29/23 0500   Appointment Info   Signing clinician's name / credentials Britany Barton PT   Total/Authorized Visits 8   Visits Used 1   Medical Diagnosis R RC tendinopathy   PT Tx Diagnosis Right shoulder pain and weakness   Other pertinent information right elbow pain ~2 weeks   Quick Adds Certification   Progress Note/Certification   Start of Care Date 06/29/23   Onset of illness/injury or Date of Surgery 10/29/22   Therapy Frequency 1x/week   Predicted Duration 6 weeks, then 2x/month for 2 visits   Certification date from 06/29/23   Certification date to 09/07/23   Progress Note Due Date 08/28/23   PT Goal 1   Goal Identifier Reaching   Goal Description AROM right shoulder flexion and abduction 120 degrees or greater with 3/10 pain or less   Rationale to maximize safety and independence with performance of ADLs and functional tasks;to maximize safety and independence within the home;to maximize safety and independence with self cares   Target Date 09/07/23   Treatment Interventions (PT)   Interventions Therapeutic Procedure/Exercise;Therapeutic Activity   Therapeutic Procedure/Exercise   Therapeutic Procedures: strength, endurance, ROM, flexibillity minutes (90436) 10   Skilled Intervention increase ROM to decrease pain and increase function   PTRx Ther Proc 1 Pendulum/Codmans   PTRx Ther Proc 1 - Details 2 minutes 3-4 times a day - fwd/back and side/side   PTRx Ther Proc 2 Passive Shoulder Flexion Sitting   PTRx Ther Proc 2 - Details 10 2x/day avoid increasing pain    PTRx Ther Proc 3 Isometric Shoulder Flexion   PTRx Ther Proc 3 - Details 10 with 3\" hold 1x/day avoid increasing pain    PTRx Ther Proc 4 Elbow Active Range of Motion Extension in Supination   PTRx Ther Proc 4 - Details Elbow:  trial 10 reps 5x/day stop if " getting worse overall   Therapeutic Activity   Ther Act 1 CP right shoulder 2-3x/day   Skilled Intervention decrease inflammation   Eval/Assessments   PT Eval, Low Complexity Minutes (70093) 30   Total Session Time   Timed Code Treatment Minutes 10   Total Treatment Time (sum of timed and untimed services) 40         Referring Provider:  Shady Rivera

## 2023-11-01 NOTE — PLAN OF CARE
PRIMARY DIAGNOSIS: ACUTE PAIN  OUTPATIENT/OBSERVATION GOALS TO BE MET BEFORE DISCHARGE:  1. Pain Status: Pain free.    2. Return to near baseline physical activity: Yes    3. Cleared for discharge by consultants (if involved): No    Discharge Planner Nurse   Safe discharge environment identified: No  Barriers to discharge: Yes       Entered by: Dorothy Weston 02/21/2019 10:27 PM     Please review provider order for any additional goals.   Nurse to notify provider when observation goals have been met and patient is ready for discharge.   Vaccine status unknown

## 2024-08-10 ENCOUNTER — HEALTH MAINTENANCE LETTER (OUTPATIENT)
Age: 59
End: 2024-08-10

## 2025-08-16 ENCOUNTER — HEALTH MAINTENANCE LETTER (OUTPATIENT)
Age: 60
End: 2025-08-16

## (undated) DEVICE — NDL PERC ENTRY THINWALL 18GA 7.0" G00166

## (undated) DEVICE — KIT HAND CONTROL ANGIOTOUCH ACIST 65CM AT-P65

## (undated) DEVICE — DEFIB PRO-PADZ LVP LQD GEL ADULT 8900-2105-01

## (undated) DEVICE — CATH DIAG 4FR ANG PIG 538453S

## (undated) DEVICE — INTRO SHEATH 4FRX10CM PINNACLE RSS402

## (undated) DEVICE — CATH DIAG 4FR JL 4.5 538417

## (undated) DEVICE — CATH ANGIO INFINITI 3DRC 4FRX100CM 538476

## (undated) DEVICE — MANIFOLD KIT ANGIO AUTOMATED 014613

## (undated) DEVICE — TOTE ANGIO CORP PC15AT SAN32CC83O

## (undated) RX ORDER — HYDRALAZINE HYDROCHLORIDE 20 MG/ML
INJECTION INTRAMUSCULAR; INTRAVENOUS
Status: DISPENSED
Start: 2019-02-21

## (undated) RX ORDER — LIDOCAINE HYDROCHLORIDE 10 MG/ML
INJECTION, SOLUTION EPIDURAL; INFILTRATION; INTRACAUDAL; PERINEURAL
Status: DISPENSED
Start: 2019-02-21

## (undated) RX ORDER — HEPARIN SODIUM 1000 [USP'U]/ML
INJECTION, SOLUTION INTRAVENOUS; SUBCUTANEOUS
Status: DISPENSED
Start: 2019-02-21

## (undated) RX ORDER — REGADENOSON 0.08 MG/ML
INJECTION, SOLUTION INTRAVENOUS
Status: DISPENSED
Start: 2019-02-20

## (undated) RX ORDER — FENTANYL CITRATE 50 UG/ML
INJECTION, SOLUTION INTRAMUSCULAR; INTRAVENOUS
Status: DISPENSED
Start: 2019-02-21

## (undated) RX ORDER — METOPROLOL TARTRATE 1 MG/ML
INJECTION, SOLUTION INTRAVENOUS
Status: DISPENSED
Start: 2019-02-21

## (undated) RX ORDER — ALBUTEROL SULFATE 90 UG/1
AEROSOL, METERED RESPIRATORY (INHALATION)
Status: DISPENSED
Start: 2019-02-20

## (undated) RX ORDER — ONDANSETRON 2 MG/ML
INJECTION INTRAMUSCULAR; INTRAVENOUS
Status: DISPENSED
Start: 2019-02-21